# Patient Record
Sex: MALE | Employment: OTHER | URBAN - METROPOLITAN AREA
[De-identification: names, ages, dates, MRNs, and addresses within clinical notes are randomized per-mention and may not be internally consistent; named-entity substitution may affect disease eponyms.]

---

## 2019-03-12 ENCOUNTER — OFFICE VISIT (OUTPATIENT)
Dept: FAMILY MEDICINE CLINIC | Age: 59
End: 2019-03-12

## 2019-03-12 VITALS
HEART RATE: 101 BPM | RESPIRATION RATE: 14 BRPM | OXYGEN SATURATION: 96 % | DIASTOLIC BLOOD PRESSURE: 72 MMHG | SYSTOLIC BLOOD PRESSURE: 104 MMHG | WEIGHT: 198 LBS | HEIGHT: 71 IN | BODY MASS INDEX: 27.72 KG/M2 | TEMPERATURE: 97.7 F

## 2019-03-12 DIAGNOSIS — Z90.49 S/P PARTIAL COLECTOMY: ICD-10-CM

## 2019-03-12 DIAGNOSIS — E11.8 TYPE 2 DIABETES MELLITUS WITH COMPLICATION, UNSPECIFIED WHETHER LONG TERM INSULIN USE: Primary | ICD-10-CM

## 2019-03-12 DIAGNOSIS — K63.1 PERFORATED BOWEL (HCC): ICD-10-CM

## 2019-03-12 RX ORDER — CEFTRIAXONE SODIUM 2 G/1
2 INJECTION, POWDER, FOR SOLUTION INTRAVENOUS EVERY 24 HOURS
COMMUNITY
End: 2019-09-18 | Stop reason: ALTCHOICE

## 2019-03-12 RX ORDER — L. ACIDOPHILUS/L.BULGARICUS 100MM CELL
1 GRANULES IN PACKET (EA) ORAL 2 TIMES DAILY
COMMUNITY
End: 2019-09-18 | Stop reason: ALTCHOICE

## 2019-03-12 RX ORDER — METRONIDAZOLE 500 MG/1
TABLET ORAL 3 TIMES DAILY
COMMUNITY
End: 2019-09-18 | Stop reason: ALTCHOICE

## 2019-03-12 RX ORDER — INSULIN GLARGINE 100 [IU]/ML
INJECTION, SOLUTION SUBCUTANEOUS
COMMUNITY
End: 2019-04-12 | Stop reason: SDUPTHER

## 2019-03-12 RX ORDER — BISMUTH SUBSALICYLATE 262 MG
1 TABLET,CHEWABLE ORAL DAILY
COMMUNITY
End: 2020-06-22 | Stop reason: ALTCHOICE

## 2019-03-12 RX ORDER — LANOLIN ALCOHOL/MO/W.PET/CERES
CREAM (GRAM) TOPICAL DAILY
COMMUNITY
End: 2020-03-20

## 2019-03-12 NOTE — PROGRESS NOTES
.  Chief Complaint   Patient presents with   [address] Patient   [facility] Follow Up     [facility]     . 1. Have you been to the ER, urgent care clinic since your last visit? Hospitalized since your last visit? Yes [facility]    2. Have you seen or consulted any other health care providers outside of the [address] [place] [address] since your last visit? Include any pap smears or colon screening. Yes    . Health Maintenance Due   Topic Date Due    Hepatitis C Screening  1960    DTaP/Tdap/Td series (1 - Tdap) 07/25/1983    Shingrix Vaccine Age 50> (1 of 2) 07/25/2012    FOBT Q 1 YEAR AGE 50-75  07/25/2012    Influenza Age 9 to Adult  08/01/2018     . Jo-Ann Richard

## 2019-03-12 NOTE — PROGRESS NOTES
HPI  Gale Gallagher is a 66 y.o. male who presents to follow-up from a hospital stay [facility] from 3/13/8. Was feeling poorly for about a month prior to that, decreased appetite. Admitted to the hospital and found to have had diverticulitis with a perforated bowel. Received antibiotics. Had a partial colectomy. Had a re-anastomosis. Has already followed up with surgeon and had staples removed. Has a PICC line. Wife has been trained to give him ceftriaxone IV every 24 hours. Do not have the discharge summary at this moment. His discharge paperwork as his current medication list which is accurate to what he is taking. He does have a history of diabetes. Diagnosed 15 years ago. Was placed on metformin lost 115 pounds. He stopped taking the Metformin because he had good control with diet. After a year or 2 he stopped going to the doctor. Estimates he has not had his blood sugar checked in over 10 years. Evidently as part of this hospital stay his blood sugar was found to be elevated and he was started on Lantus 24 units nightly. He was discharged from the hospital with 4 pens which is enough for 50 days    He was drinking 12 beers a day for the previous 2 years. Stopped this about a month before going to the hospital.  Smoking a pack a day for 25 years quit a month before hospital    PMHx:  No past medical history on file. Meds:   Current Outpatient Medications   Medication Sig Dispense Refill    cefTRIAXone (ROCEPHIN) 2 gram solr 2 g by IntraVENous route every twenty-four (24) hours.  insulin glargine (LANTUS,BASAGLAR) 100 unit/mL (3 mL) inpn by SubCUTAneous route.  lactobacillus-acidophilus (LACTINEX) 100 million cell grpk Take 1 Packet by mouth two (2) times a day.  metroNIDAZOLE (FLAGYL) 500 mg tablet Take  by mouth three (3) times daily.  multivitamin (ONE A DAY) tablet Take 1 Tab by mouth daily.  thiamine HCL (B-1) 100 mg tablet Take  by mouth daily. Allergies:   Not on File    Smoker:  Social History     Tobacco Use   Smoking Status Former Smoker    Last attempt to quit: 2019    Years since quittin.0   Smokeless Tobacco Never Used       ETOH:   Social History     Substance and Sexual Activity   Alcohol Use No    Frequency: Never       FH: No family history on file. ROS:   As listed in HPI. In addition:  Constitutional:   No headache, fever, fatigue, weight loss or weight gain      Cardiac:    No chest pain      Resp:   No cough or shortness of breath      Neuro   No loss of consciousness, dizziness, seizures      Physical Exam:  Blood pressure 104/72, pulse (!) 101, temperature 97.7 °F (36.5 °C), resp. rate 14, height 5' 11\" (1.803 m), weight 198 lb (89.8 kg), SpO2 96 %. GEN: No apparent distress. Alert and oriented and responds to all questions appropriately. NEUROLOGIC:  No focal neurologic deficits. Strength and sensation grossly intact. Coordination and gait grossly intact. EXT: Well perfused. No edema. SKIN: No obvious rashes. Lungs clear to auscultation bilaterally  CV regular rate and rhythm no murmur  HEENT clear tympanic membrane clear nasal mucosa clear oromucosa  Abdomen normoactive bowel sounds. There is a midline laparotomy wound that appears to be healing well and a drain in the right lower quadrant that is still draining       Assessment and Plan     Perforated bowel due to diverticulitis status post partial colectomy  Following with surgeon. Postop day 10  Receiving ceftriaxone through PICC line. Wife trained on this  Home health set this up and he believes that he has another nurse coming sometime soon     diabetes  Diagnosis 15 years ago, medication for a year or 2, lost weight and became diet controlled. Lost to follow-up  Currently taking Lantus 24 units    Does not currently have insurance but is working on it. Lantus is not going to be a viable option without insurance. Collect data.   We will try to get the discharge summary and blood work. A1c CMP lipid panel today. May start metformin based on this blood work    Has a meter and has noticed sugar between 1 30200 but checking random times throughout the day. Please check fasting sugars    Follow-up in 2-4 weeks with sugar log    Received discharge summary. Written by his surgeon. Apparently it was a right-sided bowel perforation in the pericecal region suggesting a perforated appendicitis or colonic perforation. Procedure performed was exploratory laparotomy, right hemicolectomy with primary anastomosis with LIU drain. Labs at hospital discharge WBC 9.5, hemoglobin 10.5, calcium 7.7, creatinine 0.4. No mention of hospitalist or infectious disease recommendations    Based on these labs that he has a slight increase in his WBCs. An improvement in his hemoglobin and calcium    Needs his WBCs rechecked in 1 week (about 3/19). We will see if home health can do this      ICD-10-CM ICD-9-CM    1. Type 2 diabetes mellitus with complication, unspecified whether long term insulin use (HCC) E11.8 250.90 HEMOGLOBIN A1C WITH EAG   2. Perforated bowel ([facility] [address] Z48.4 978.56 METABOLIC PANEL, COMPREHENSIVE      CBC WITH AUTOMATED DIFF      LIPID PANEL   3. S/P partial colectomy Z90.49 V45.89        AVS given.  Pt expressed understanding of instructions

## 2019-03-13 ENCOUNTER — TELEPHONE (OUTPATIENT)
Dept: FAMILY MEDICINE CLINIC | Age: 59
End: 2019-03-13

## 2019-03-13 LAB
ALBUMIN SERPL-MCNC: 2.7 G/DL (ref 3.5–5.5)
ALBUMIN/GLOB SERPL: 0.5 {RATIO} (ref 1.2–2.2)
ALP SERPL-CCNC: 106 IU/L (ref 39–117)
ALT SERPL-CCNC: 16 IU/L (ref 0–44)
AST SERPL-CCNC: 18 IU/L (ref 0–40)
BASOPHILS # BLD AUTO: 0 X10E3/UL (ref 0–0.2)
BASOPHILS NFR BLD AUTO: 0 %
BILIRUB SERPL-MCNC: <0.2 MG/DL (ref 0–1.2)
BUN SERPL-MCNC: 8 MG/DL (ref 6–24)
BUN/CREAT SERPL: 15 (ref 9–20)
CALCIUM SERPL-MCNC: 8.4 MG/DL (ref 8.7–10.2)
CHLORIDE SERPL-SCNC: 98 MMOL/L (ref 96–106)
CHOLEST SERPL-MCNC: 148 MG/DL (ref 100–199)
CO2 SERPL-SCNC: 22 MMOL/L (ref 20–29)
CREAT SERPL-MCNC: 0.52 MG/DL (ref 0.76–1.27)
EOSINOPHIL # BLD AUTO: 0.1 X10E3/UL (ref 0–0.4)
EOSINOPHIL NFR BLD AUTO: 1 %
ERYTHROCYTE [DISTWIDTH] IN BLOOD BY AUTOMATED COUNT: 14 % (ref 12.3–15.4)
EST. AVERAGE GLUCOSE BLD GHB EST-MCNC: 252 MG/DL
GLOBULIN SER CALC-MCNC: 5 G/DL (ref 1.5–4.5)
GLUCOSE SERPL-MCNC: 172 MG/DL (ref 65–99)
HBA1C MFR BLD: 10.4 % (ref 4.8–5.6)
HCT VFR BLD AUTO: 37.1 % (ref 37.5–51)
HDLC SERPL-MCNC: 26 MG/DL
HGB BLD-MCNC: 12 G/DL (ref 13–17.7)
IMM GRANULOCYTES # BLD AUTO: 0 X10E3/UL (ref 0–0.1)
IMM GRANULOCYTES NFR BLD AUTO: 0 %
INTERPRETATION, 910389: NORMAL
LDLC SERPL CALC-MCNC: 81 MG/DL (ref 0–99)
LYMPHOCYTES # BLD AUTO: 2.6 X10E3/UL (ref 0.7–3.1)
LYMPHOCYTES NFR BLD AUTO: 23 %
Lab: NORMAL
MCH RBC QN AUTO: 29.3 PG (ref 26.6–33)
MCHC RBC AUTO-ENTMCNC: 32.3 G/DL (ref 31.5–35.7)
MCV RBC AUTO: 91 FL (ref 79–97)
MONOCYTES # BLD AUTO: 1.1 X10E3/UL (ref 0.1–0.9)
MONOCYTES NFR BLD AUTO: 9 %
NEUTROPHILS # BLD AUTO: 7.5 X10E3/UL (ref 1.4–7)
NEUTROPHILS NFR BLD AUTO: 67 %
PLATELET # BLD AUTO: 691 X10E3/UL (ref 150–379)
POTASSIUM SERPL-SCNC: 4.8 MMOL/L (ref 3.5–5.2)
PROT SERPL-MCNC: 7.7 G/DL (ref 6–8.5)
RBC # BLD AUTO: 4.09 X10E6/UL (ref 4.14–5.8)
SODIUM SERPL-SCNC: 138 MMOL/L (ref 134–144)
TRIGL SERPL-MCNC: 206 MG/DL (ref 0–149)
VLDLC SERPL CALC-MCNC: 41 MG/DL (ref 5–40)
WBC # BLD AUTO: 11.3 X10E3/UL (ref 3.4–10.8)

## 2019-03-13 RX ORDER — METFORMIN HYDROCHLORIDE 500 MG/1
500 TABLET ORAL 2 TIMES DAILY WITH MEALS
Qty: 60 TAB | Refills: 3 | Status: SHIPPED | OUTPATIENT
Start: 2019-03-13 | End: 2019-07-06 | Stop reason: SDUPTHER

## 2019-03-13 NOTE — TELEPHONE ENCOUNTER
Patient verified his name and . Patient notified of lab results. Patient will start metformin. He verbalized understanding.

## 2019-03-13 NOTE — TELEPHONE ENCOUNTER
Labs reviewed. Kidney function is fine. We can start a low dose of metformin to help the insulin work a little better. Will call in metformin 500 mg twice daily.   Other signs in the blood work suggest that he is getting over a serious infection but have not received the hospital record yet to be able to compare how he is doing now compared to how he was doing in the hospital

## 2019-03-13 NOTE — TELEPHONE ENCOUNTER
From [employer]:  Pt is requesting to speak with nurse in regard to recent office visit and lab work.       Best contact: [phone]

## 2019-03-14 ENCOUNTER — TELEPHONE (OUTPATIENT)
Dept: FAMILY MEDICINE CLINIC | Age: 59
End: 2019-03-14

## 2019-03-14 NOTE — TELEPHONE ENCOUNTER
Received hospital discharge summary. Comparing his discharge labs to the ones we obtained on 3/12 his WBC count has gone up slightly. This is a marker of infection and needs to be followed closely. Repeat CBC early next week (somewhere around 3/18-3/20). He is receiving home health. See if they can do this.   Otherwise lab visit

## 2019-03-14 NOTE — TELEPHONE ENCOUNTER
Patient verbalized understanding. Patient states [place] only comes on Wednesday. Patient scheduled to have labs on 3/18/19.

## 2019-03-18 ENCOUNTER — TELEPHONE (OUTPATIENT)
Dept: FAMILY MEDICINE CLINIC | Age: 59
End: 2019-03-18

## 2019-03-18 ENCOUNTER — DOCUMENTATION ONLY (OUTPATIENT)
Dept: FAMILY MEDICINE CLINIC | Age: 59
End: 2019-03-18

## 2019-03-18 ENCOUNTER — LAB ONLY (OUTPATIENT)
Dept: FAMILY MEDICINE CLINIC | Age: 59
End: 2019-03-18

## 2019-03-18 DIAGNOSIS — D72.829 LEUKOCYTOSIS, UNSPECIFIED TYPE: Primary | ICD-10-CM

## 2019-03-18 LAB
BASOPHILS # BLD AUTO: 0.1 X10E3/UL (ref 0–0.2)
BASOPHILS NFR BLD AUTO: 1 %
EOSINOPHIL # BLD AUTO: 0.1 X10E3/UL (ref 0–0.4)
EOSINOPHIL NFR BLD AUTO: 1 %
ERYTHROCYTE [DISTWIDTH] IN BLOOD BY AUTOMATED COUNT: 14.6 % (ref 12.3–15.4)
HCT VFR BLD AUTO: 38.5 % (ref 37.5–51)
HGB BLD-MCNC: 13 G/DL (ref 13–17.7)
LYMPHOCYTES # BLD AUTO: 2.3 X10E3/UL (ref 0.7–3.1)
LYMPHOCYTES NFR BLD AUTO: 30 %
MCH RBC QN AUTO: 29.4 PG (ref 26.6–33)
MCHC RBC AUTO-ENTMCNC: 33.8 G/DL (ref 31.5–35.7)
MCV RBC AUTO: 87 FL (ref 79–97)
MONOCYTES # BLD AUTO: 0.9 X10E3/UL (ref 0.1–0.9)
MONOCYTES NFR BLD AUTO: 12 %
NEUTROPHILS # BLD AUTO: 4.3 X10E3/UL (ref 1.4–7)
NEUTROPHILS NFR BLD AUTO: 56 %
PLATELET # BLD AUTO: 549 X10E3/UL (ref 150–379)
RBC # BLD AUTO: 4.42 X10E6/UL (ref 4.14–5.8)
WBC # BLD AUTO: 7.7 X10E3/UL (ref 3.4–10.8)

## 2019-03-18 NOTE — TELEPHONE ENCOUNTER
Patient verified his name and . Patient notified of lab results per Dr. Navin Randhawa. Patient called surgeon. Having repeat ct scan and see's surgeon on Thursday.

## 2019-03-18 NOTE — PROGRESS NOTES
Patient here to get a repeat CBC for a mild leukocytosis increasing since his hospital stay. Called into the lab because last night his drain started changing color. This morning it is a yellow brown opaque fluid. It is thin, not obviously pus but not the healthy pink that we want. No fever. Feels pretty good. Has good appetite  Still getting his antibiotics    Stat CBC    Explained my concern of infection versus leak.   Please reach out to your surgeon

## 2019-04-12 ENCOUNTER — OFFICE VISIT (OUTPATIENT)
Dept: FAMILY MEDICINE CLINIC | Age: 59
End: 2019-04-12

## 2019-04-12 VITALS
HEART RATE: 88 BPM | OXYGEN SATURATION: 96 % | TEMPERATURE: 97.9 F | WEIGHT: 194 LBS | RESPIRATION RATE: 18 BRPM | DIASTOLIC BLOOD PRESSURE: 61 MMHG | BODY MASS INDEX: 27.16 KG/M2 | SYSTOLIC BLOOD PRESSURE: 94 MMHG | HEIGHT: 71 IN

## 2019-04-12 DIAGNOSIS — K63.1 PERFORATED BOWEL (HCC): ICD-10-CM

## 2019-04-12 DIAGNOSIS — E11.8 TYPE 2 DIABETES MELLITUS WITH COMPLICATION, UNSPECIFIED WHETHER LONG TERM INSULIN USE: Primary | ICD-10-CM

## 2019-04-12 RX ORDER — LANCETS
EACH MISCELLANEOUS
Qty: 200 EACH | Refills: 11 | Status: SHIPPED | OUTPATIENT
Start: 2019-04-12

## 2019-04-12 RX ORDER — INSULIN GLARGINE 100 [IU]/ML
20 INJECTION, SOLUTION SUBCUTANEOUS
Qty: 6 ML | Refills: 5 | Status: SHIPPED | OUTPATIENT
Start: 2019-04-12 | End: 2019-09-18 | Stop reason: ALTCHOICE

## 2019-04-12 NOTE — PROGRESS NOTES
HPI  Madyson Ha is a 66 y.o. male who recently establish care with me after a hospital stay Trini Avitia from 3/1-3/8. Was feeling poorly for about a month prior to that, decreased appetite. Admitted to the hospital and found to have had diverticulitis with a perforated bowel. Received antibiotics. Had a partial colectomy. Had a re-anastomosis. Has already followed up with surgeon and had staples removed. Has a PICC line. Wife has been trained to give him ceftriaxone IV every 24 hours.      He does have a history of diabetes. Diagnosed 15 years ago. Was placed on metformin lost 115 pounds. He stopped taking the Metformin because he had good control with diet. After a year or 2 he stopped going to the doctor. Estimates he has not had his blood sugar checked in over 10 years. Evidently as part of this hospital stay his blood sugar was found to be elevated and he was started on Lantus 24 units nightly. He was discharged from the hospital with 4 pens which is enough for 50 days     He was drinking 12 beers a day for the previous 2 years. Stopped this about a month before going to the hospital.  Smoking a pack a day for 25 years quit a month before hospital      PMHx:  No past medical history on file. Meds:   Current Outpatient Medications   Medication Sig Dispense Refill    insulin glargine (LANTUS,BASAGLAR) 100 unit/mL (3 mL) inpn 20 Units by SubCUTAneous route nightly. 6 mL 5    glucose blood VI test strips (BLOOD GLUCOSE TEST) strip Use to check blood sugar 3 times daily 200 Strip 11    lancets misc Test 3 times daily. 200 Each 11    metFORMIN (GLUCOPHAGE) 500 mg tablet Take 1 Tab by mouth two (2) times daily (with meals). 60 Tab 3    lactobacillus-acidophilus (LACTINEX) 100 million cell grpk Take 1 Packet by mouth two (2) times a day.  metroNIDAZOLE (FLAGYL) 500 mg tablet Take  by mouth three (3) times daily.  multivitamin (ONE A DAY) tablet Take 1 Tab by mouth daily.  thiamine HCL (B-1) 100 mg tablet Take  by mouth daily.  cefTRIAXone (ROCEPHIN) 2 gram solr 2 g by IntraVENous route every twenty-four (24) hours. Allergies:   No Known Allergies    Smoker:  Social History     Tobacco Use   Smoking Status Former Smoker    Last attempt to quit: 2019    Years since quittin.1   Smokeless Tobacco Never Used       ETOH:   Social History     Substance and Sexual Activity   Alcohol Use No    Frequency: Never       FH: No family history on file. ROS:   As listed in HPI. In addition:  Constitutional:   No headache, fever, fatigue, weight loss or weight gain      Cardiac:    No chest pain      Resp:   No cough or shortness of breath      Neuro   No loss of consciousness, dizziness, seizures      Physical Exam:  Blood pressure 94/61, pulse 88, temperature 97.9 °F (36.6 °C), temperature source Oral, resp. rate 18, height 5' 11\" (1.803 m), weight 194 lb (88 kg), SpO2 96 %. GEN: No apparent distress. Alert and oriented and responds to all questions appropriately. NEUROLOGIC:  No focal neurologic deficits. Strength and sensation grossly intact. Coordination and gait grossly intact. EXT: Well perfused. No edema. SKIN: No obvious rashes. Assessment and Plan       Diabetes. Currently taking metformin 500 mg twice daily  Currently Lantus 24 units  Fasting blood sugars 80-1 10  Feeling a little run down, not hypoglycemic. Start backing off on the insulin  Start Lantus 20 units  Still has samples he was given in the hospital.  Will call in a refill. Still need to figure out what brand his insurance will cover    Had another abscess. And had a new drain placed yesterday. Blood pressure dropped during procedure. Blood pressure is low today. Not taking any medicine for this. Encouraged hydration    Blood work from yesterday in care everywhere. Still anemic but hemoglobin improving.   White count is normal  Creatinine is normal      ICD-10-CM ICD-9-CM 1. Type 2 diabetes mellitus with complication, unspecified whether long term insulin use ([facility]) E11.8 250.90 insulin glargine (LANTUS,BASAGLAR) 100 unit/mL (3 mL) inpn      glucose blood VI test strips (BLOOD GLUCOSE TEST) strip      lancets misc   2. Perforated bowel ([facility] K63.1 569.83        AVS given.  Pt expressed understanding of instructions

## 2019-04-12 NOTE — PROGRESS NOTES
Chief Complaint   Patient presents with    Diabetes     1 month follow-up      Health Maintenance reviewed     1. Have you been to the ER, urgent care clinic since your last visit? Hospitalized since your last visit? Yes, [facility]    2. Have you seen or consulted any other health care providers outside of the [address] [place] [address] since your last visit? Include any pap smears or colon screening.  Yes, infectious disease- Dr. Ginny Hodge

## 2019-06-04 ENCOUNTER — OFFICE VISIT (OUTPATIENT)
Dept: FAMILY MEDICINE CLINIC | Age: 59
End: 2019-06-04

## 2019-06-04 VITALS
SYSTOLIC BLOOD PRESSURE: 104 MMHG | WEIGHT: 197 LBS | OXYGEN SATURATION: 97 % | RESPIRATION RATE: 16 BRPM | DIASTOLIC BLOOD PRESSURE: 71 MMHG | HEIGHT: 71 IN | BODY MASS INDEX: 27.58 KG/M2 | HEART RATE: 81 BPM | TEMPERATURE: 97.5 F

## 2019-06-04 DIAGNOSIS — M54.42 CHRONIC LEFT-SIDED LOW BACK PAIN WITH LEFT-SIDED SCIATICA: ICD-10-CM

## 2019-06-04 DIAGNOSIS — G89.29 CHRONIC LEFT-SIDED LOW BACK PAIN WITH LEFT-SIDED SCIATICA: ICD-10-CM

## 2019-06-04 DIAGNOSIS — E11.8 TYPE 2 DIABETES MELLITUS WITH COMPLICATION, UNSPECIFIED WHETHER LONG TERM INSULIN USE: Primary | ICD-10-CM

## 2019-06-04 LAB
ALBUMIN UR QL STRIP: 10 MG/L
CREATININE, URINE POC: 100 MG/DL
HBA1C MFR BLD HPLC: 5.5 %
MICROALBUMIN/CREAT RATIO POC: <30 MG/G

## 2019-06-04 RX ORDER — PEN NEEDLE, DIABETIC 30 GX3/16"
NEEDLE, DISPOSABLE MISCELLANEOUS
Qty: 1 PACKAGE | Refills: 11 | Status: SHIPPED | OUTPATIENT
Start: 2019-06-04 | End: 2021-01-13 | Stop reason: SDUPTHER

## 2019-06-04 NOTE — PROGRESS NOTES
PEDRO LUIS Dhaliwal is a 66 y.o. male who recently establish care with me after a hospital stay [facility] Jones from 3/13/8. Was feeling poorly for about a month prior to that, decreased appetite. Admitted to the hospital and found to have had diverticulitis with a perforated bowel. Received antibiotics. Had a partial colectomy. Had a re-anastomosis. Has already followed up with surgeon and had staples removed. Has a PICC line. Wife has been trained to give him ceftriaxone IV every 24 hours.      He does have a history of diabetes. Diagnosed 15 years ago. Was placed on metformin lost 115 pounds. He stopped taking the Metformin because he had good control with diet. After a year or 2 he stopped going to the doctor. Estimates he has not had his blood sugar checked in over 10 years. Evidently as part of this hospital stay his blood sugar was found to be elevated and he was started on Lantus 24 units nightly. He was discharged from the hospital with 4 pens which is enough for 50 days     He was drinking 12 beers a day for the previous 2 years. Stopped this about a month before going to the hospital.  Smoking a pack a day for 25 years quit a month before hospital    Has a history of chronic left-sided low back pain. This was a work-related injury he believes from 2017. He needed surgery at that time for herniated disks and pinched nerves in tolerable pain and inability to improve with physical therapy. Still has some left-sided weakness and notices that his endurance is limited to about 10-15 minutes when working in the garden before he gets left-sided low back pain and has some trouble walking. Points to the left side and quadratus lumborum when describing this and cannot identify particular dermatome when talking about his sciatica. 4/5 strength in the left quad, left hamstring and dorsiflexion of the foot    PMHx:  No past medical history on file.     Meds:   Current Outpatient Medications Medication Sig Dispense Refill    Insulin Needles, Disposable, 31 gauge x \" ndle Inject Lantus daily 1 Package 11    insulin glargine (LANTUS,BASAGLAR) 100 unit/mL (3 mL) inpn 20 Units by SubCUTAneous route nightly. 6 mL 5    glucose blood VI test strips (BLOOD GLUCOSE TEST) strip Use to check blood sugar 3 times daily 200 Strip 11    lancets misc Test 3 times daily. 200 Each 11    metFORMIN (GLUCOPHAGE) 500 mg tablet Take 1 Tab by mouth two (2) times daily (with meals). 60 Tab 3    cefTRIAXone (ROCEPHIN) 2 gram solr 2 g by IntraVENous route every twenty-four (24) hours.  lactobacillus-acidophilus (LACTINEX) 100 million cell grpk Take 1 Packet by mouth two (2) times a day.  metroNIDAZOLE (FLAGYL) 500 mg tablet Take  by mouth three (3) times daily.  multivitamin (ONE A DAY) tablet Take 1 Tab by mouth daily.  thiamine HCL (B-1) 100 mg tablet Take  by mouth daily. Allergies:   No Known Allergies    Smoker:  Social History     Tobacco Use   Smoking Status Former Smoker    Last attempt to quit: 2019    Years since quittin.2   Smokeless Tobacco Never Used       ETOH:   Social History     Substance and Sexual Activity   Alcohol Use No    Frequency: Never       FH: No family history on file. ROS:   As listed in HPI. In addition:  Constitutional:   No headache, fever, fatigue, weight loss or weight gain      Cardiac:    No chest pain      Resp:   No cough or shortness of breath      Neuro   No loss of consciousness, dizziness, seizures      Physical Exam:  Blood pressure 104/71, pulse 81, temperature 97.5 °F (36.4 °C), temperature source Oral, resp. rate 16, height 5' 11\" (1.803 m), weight 197 lb (89.4 kg), SpO2 97 %. GEN: No apparent distress. Alert and oriented and responds to all questions appropriately. NEUROLOGIC:  No focal neurologic deficits. Strength and sensation grossly intact. Coordination and gait grossly intact. EXT: Well perfused. No edema.   SKIN: No obvious rashes. Assessment and Plan       Diabetes. A1c 5.5% down from 10.4% in March  Currently taking metformin 500 mg twice daily  Currently Lantus 20 units  Fasting blood sugars 801 10  One episode of hypoglycemia  Decrease Lantus 15 units ($0 co-pay)    Think we can probably get away with oral medication so we will work to wean down on the insulin. 2-week follow-up, consider increasing metformin to 1000 mg twice daily and further dose reduction of Lantus. When blood sugar starts creeping up there will consider adding glipizide      Abdominal abscesses appear to resolve. Had last drain removed last week        Back pain  I suggest retrying physical therapy since he may have a large muscle group issue and it sounds like it could be an endurance issue  Question of lift restriction. Was restricted to 30 pounds and he is wondering if he can work but not sure what he could do with this restriction  Refer back to orthopedics with that question as well as any intervention that may be beneficial    Check blood work in care everywhere. Last check was 4/16/2019. Creatinine 0.4, GFR greater than 60        ICD-10-CM ICD-9-CM    1. Type 2 diabetes mellitus with complication, unspecified whether long term insulin use ([facility]) E11.8 250.90 Insulin Needles, Disposable, 31 gauge x 5/16\" ndle      AMB POC HEMOGLOBIN A1C      AMB POC URINE, MICROALBUMIN, SEMIQUANT (3 RESULTS)   2. Chronic left-sided low back pain with left-sided sciatica M54.42 724.2 REFERRAL TO PHYSICAL THERAPY    G89.29 724.3 REFERRAL TO ORTHOPEDICS     338.29        AVS given.  Pt expressed understanding of instructions

## 2019-06-04 NOTE — PROGRESS NOTES
.  Chief Complaint   Patient presents with    Diabetes    Rash     . 1. Have you been to the ER, urgent care clinic since your last visit? Hospitalized since your last visit? no    2. Have you seen or consulted any other health care providers outside of the [address] [place] [address] since your last visit? Include any pap smears or colon screening. No    .  Health Maintenance Due   Topic Date Due    Hepatitis C Screening  1960    Pneumococcal 0-64 years (1 of 1 - PPSV23) 07/25/1968    FOOT EXAM Q1  07/25/1972    MICROALBUMIN Q1  07/25/1972    EYE EXAM RETINAL OR DILATED  07/25/1972    DTaP/Tdap/Td series (1 - Tdap) 07/25/1983    Shingrix Vaccine Age 50> (1 of 2) 07/25/2012    FOBT Q 1 YEAR AGE 50-75  07/25/2012     . Vinicio Marie

## 2019-06-19 ENCOUNTER — OFFICE VISIT (OUTPATIENT)
Dept: FAMILY MEDICINE CLINIC | Age: 59
End: 2019-06-19

## 2019-06-19 VITALS
WEIGHT: 204 LBS | TEMPERATURE: 97.8 F | HEIGHT: 71 IN | RESPIRATION RATE: 14 BRPM | HEART RATE: 85 BPM | OXYGEN SATURATION: 95 % | BODY MASS INDEX: 28.56 KG/M2 | SYSTOLIC BLOOD PRESSURE: 127 MMHG | DIASTOLIC BLOOD PRESSURE: 79 MMHG

## 2019-06-19 DIAGNOSIS — M79.18 MYOFASCIAL PAIN: ICD-10-CM

## 2019-06-19 DIAGNOSIS — G89.29 CHRONIC LEFT-SIDED LOW BACK PAIN WITH LEFT-SIDED SCIATICA: ICD-10-CM

## 2019-06-19 DIAGNOSIS — L20.82 FLEXURAL ECZEMA: ICD-10-CM

## 2019-06-19 DIAGNOSIS — M54.42 CHRONIC LEFT-SIDED LOW BACK PAIN WITH LEFT-SIDED SCIATICA: ICD-10-CM

## 2019-06-19 DIAGNOSIS — E11.8 TYPE 2 DIABETES MELLITUS WITH COMPLICATION, UNSPECIFIED WHETHER LONG TERM INSULIN USE: Primary | ICD-10-CM

## 2019-06-19 RX ORDER — TRIAMCINOLONE ACETONIDE 1 MG/G
CREAM TOPICAL 2 TIMES DAILY
Qty: 80 G | Refills: 3 | Status: SHIPPED | OUTPATIENT
Start: 2019-06-19

## 2019-06-19 RX ORDER — TIZANIDINE 4 MG/1
4 TABLET ORAL
Qty: 30 TAB | Refills: 2 | Status: SHIPPED | OUTPATIENT
Start: 2019-06-19 | End: 2020-03-20

## 2019-06-19 NOTE — PROGRESS NOTES
.  .  .  Chief Complaint   Patient presents with    Diabetes    Rash    Tingling     feet and hands    Dizziness         . 1. Have you been to the ER, urgent care clinic since your last visit? Hospitalized since your last visit? no    2. Have you seen or consulted any other health care providers outside of the [address] [place] [address] since your last visit? Include any pap smears or colon screening. No    .  Health Maintenance Due   Topic Date Due    Hepatitis C Screening  1960    Pneumococcal 0-64 years (1 of 1 - PPSV23) 07/25/1968    FOOT EXAM Q1  07/25/1972    EYE EXAM RETINAL OR DILATED  07/25/1972    DTaP/Tdap/Td series (1 - Tdap) 07/25/1983    Shingrix Vaccine Age 50> (1 of 2) 07/25/2012    FOBT Q 1 YEAR AGE 50-75  07/25/2012     . [facility]

## 2019-06-19 NOTE — PROGRESS NOTES
HPI  Delon Galaviz is a 66 y.o. male who recently establish care with me after a hospital stay [facility] from 3/1-3/8. Was feeling poorly for about a month prior to that, decreased appetite. Admitted to the hospital and found to have had diverticulitis with a perforated bowel. Received antibiotics. Had a partial colectomy. Had a re-anastomosis. Has already followed up with surgeon and had staples removed. Has a PICC line. Wife has been trained to give him ceftriaxone IV every 24 hours.      He does have a history of diabetes. Diagnosed 15 years ago. Was placed on metformin lost 115 pounds. He stopped taking the Metformin because he had good control with diet. After a year or 2 he stopped going to the doctor. Estimates he has not had his blood sugar checked in over 10 years. Evidently as part of this hospital stay his blood sugar was found to be elevated and he was started on Lantus 24 units nightly. He was discharged from the hospital with 4 pens which is enough for 50 days     He was drinking 12 beers a day for the previous 2 years. Stopped this about a month before going to the hospital.  Smoking a pack a day for 25 years quit a month before hospital    Has a history of chronic left-sided low back pain. This was a work-related injury he believes from 2017. He needed surgery at that time for herniated disks and pinched nerves in tolerable pain and inability to improve with physical therapy. Still has some left-sided weakness and notices that his endurance is limited to about 10-15 minutes when working in the garden before he gets left-sided low back pain and has some trouble walking. Points to the left side and quadratus lumborum when describing this and cannot identify particular dermatome when talking about his sciatica. 4/5 strength in the left quad, left hamstring and dorsiflexion of the foot    PMHx:  No past medical history on file.     Meds:   Current Outpatient Medications Medication Sig Dispense Refill    tiZANidine (ZANAFLEX) 4 mg tablet Take 1 Tab by mouth nightly as needed (Spasm). 30 Tab 2    triamcinolone acetonide (KENALOG) 0.1 % topical cream Apply  to affected area two (2) times a day. use thin layer 80 g 3    Insulin Needles, Disposable, 31 gauge x 5/16\" ndle Inject Lantus daily 1 Package 11    insulin glargine (LANTUS,BASAGLAR) 100 unit/mL (3 mL) inpn 20 Units by SubCUTAneous route nightly. (Patient taking differently: 15 Units by SubCUTAneous route nightly.) 6 mL 5    glucose blood VI test strips (BLOOD GLUCOSE TEST) strip Use to check blood sugar 3 times daily 200 Strip 11    lancets misc Test 3 times daily. 200 Each 11    metFORMIN (GLUCOPHAGE) 500 mg tablet Take 1 Tab by mouth two (2) times daily (with meals). 60 Tab 3    cefTRIAXone (ROCEPHIN) 2 gram solr 2 g by IntraVENous route every twenty-four (24) hours.  lactobacillus-acidophilus (LACTINEX) 100 million cell grpk Take 1 Packet by mouth two (2) times a day.  metroNIDAZOLE (FLAGYL) 500 mg tablet Take  by mouth three (3) times daily.  multivitamin (ONE A DAY) tablet Take 1 Tab by mouth daily.  thiamine HCL (B-1) 100 mg tablet Take  by mouth daily. Allergies:   No Known Allergies    Smoker:  Social History     Tobacco Use   Smoking Status Former Smoker    Last attempt to quit: 2019    Years since quittin.3   Smokeless Tobacco Never Used       ETOH:   Social History     Substance and Sexual Activity   Alcohol Use No    Frequency: Never       FH: No family history on file. ROS:   As listed in HPI. In addition:  Constitutional:   No headache, fever, fatigue, weight loss or weight gain      Cardiac:    No chest pain      Resp:   No cough or shortness of breath      Neuro   No loss of consciousness, dizziness, seizures      Physical Exam:  Blood pressure 127/79, pulse 85, temperature 97.8 °F (36.6 °C), temperature source Oral, resp.  rate 14, height 5' 11\" (1.803 m), weight 204 lb (92.5 kg), SpO2 95 %. GEN: No apparent distress. Alert and oriented and responds to all questions appropriately. NEUROLOGIC:  No focal neurologic deficits. Strength and sensation grossly intact. Coordination and gait grossly intact. EXT: Well perfused. No edema. SKIN: No obvious rashes. Back examined. Superior trapezius myofascial point right shoulder girdle  4/5 strength in the left quad, left hamstring and dorsiflexion of the foot. No obvious foot drop         Assessment and Plan     Diabetes. A1c 5.5% down from 10.4% in March  Currently taking metformin 500 mg twice daily  Currently Lantus 15 units-fasting sugars less than 120  Decrease this to 10 units  Follow-up 2 weeks    Think we can probably get away with oral medication so we will work to wean down on the insulin. 2-week follow-up, consider increasing metformin to 1000 mg twice daily and further dose reduction of Lantus. When blood sugar starts creeping up there will consider adding glipizide      Abdominal abscesses appear to resolve. Had last drain removed last week    Rash, mild flexural eczema. No obvious exposures  Kenalog    Back pain, left-sided sciatica   Complaint of the left half cramping and aching either when he is walking a distance or when he goes to lie down in bed. Sitting is a little better and standing is a little better. Sounds like neurogenic claudication    New complaint today of right-sided shoulder girdle pain and pain/numbness on the ulnar aspect of the forearm and the fourth and fifth fingers. Negative Spurling's.  2 myofascial points in the shoulder girdle but did not exactly reproduce the radiculopathy    Tizanidine to help with sleep  Reaffirm physical therapy    Suggest to make an appointment to see orthopedics after physical therapy. I do think he needs to see Ortho.   I do think that intervention is likely but suggested he do the physical therapy first since this will probably be a requirement before any intervention is planned      Filled out Social Security form to say to be affect he needs to limit to 30 pounds lifting (per his orthopedist), neurogenic claudication limits him to walking no more than 1 city block. Cannot stand for any more than 15 minutes, cannot sit for any more than 30 minutes comfortably    2-week follow-up on blood sugar      ICD-10-CM ICD-9-CM    1. Type 2 diabetes mellitus with complication, unspecified whether long term insulin use (HCC) E11.8 250.90    2. Chronic left-sided low back pain with left-sided sciatica M54.42 724.2 tiZANidine (ZANAFLEX) 4 mg tablet    G89.29 724.3      338.29    3. Myofascial pain M79.18 729.1 tiZANidine (ZANAFLEX) 4 mg tablet   4. Flexural eczema L20.82 691.8 triamcinolone acetonide (KENALOG) 0.1 % topical cream       AVS given.  Pt expressed understanding of instructions

## 2019-06-20 RX ORDER — PEN NEEDLE, DIABETIC 31 GX3/16"
NEEDLE, DISPOSABLE MISCELLANEOUS
COMMUNITY

## 2019-06-20 NOTE — TELEPHONE ENCOUNTER
Patient says that Dr Doreen Vázquez forgot to call in dm needles when he was seen yesterday. I see that there are 11 ordered on file. Would one of you ladies call him or [facility] point?

## 2019-06-20 NOTE — TELEPHONE ENCOUNTER
Requested Prescriptions     Pending Prescriptions Disp Refills    Insulin Needles, Disposable, 32 gauge x \" ndle 100 Pen Needle 11     Si Pen Needle by Does Not Apply route daily. VORB with Garrel Gaucher (pharmacist) for refill of  pen needles with same directions of injection of Lantus daily per  [facility]. Understanding verbalized.

## 2019-07-08 RX ORDER — METFORMIN HYDROCHLORIDE 500 MG/1
TABLET ORAL
Qty: 60 TAB | Refills: 3 | Status: SHIPPED | OUTPATIENT
Start: 2019-07-08 | End: 2019-09-11 | Stop reason: DRUGHIGH

## 2019-08-22 ENCOUNTER — OFFICE VISIT (OUTPATIENT)
Dept: FAMILY MEDICINE CLINIC | Age: 59
End: 2019-08-22

## 2019-08-22 VITALS
SYSTOLIC BLOOD PRESSURE: 130 MMHG | DIASTOLIC BLOOD PRESSURE: 87 MMHG | WEIGHT: 208 LBS | TEMPERATURE: 97.6 F | HEART RATE: 76 BPM | OXYGEN SATURATION: 98 % | HEIGHT: 71 IN | BODY MASS INDEX: 29.12 KG/M2 | RESPIRATION RATE: 16 BRPM

## 2019-08-22 DIAGNOSIS — E11.8 TYPE 2 DIABETES MELLITUS WITH COMPLICATION, UNSPECIFIED WHETHER LONG TERM INSULIN USE: Primary | ICD-10-CM

## 2019-08-22 DIAGNOSIS — M54.32 SCIATICA OF LEFT SIDE: ICD-10-CM

## 2019-08-22 LAB — HBA1C MFR BLD HPLC: 6.1 %

## 2019-08-22 RX ORDER — GABAPENTIN 100 MG/1
100 CAPSULE ORAL
Qty: 30 CAP | Refills: 2 | Status: SHIPPED | OUTPATIENT
Start: 2019-08-22 | End: 2020-03-20 | Stop reason: SDUPTHER

## 2019-08-22 NOTE — PROGRESS NOTES
PEDRO LUIS Duron is a 64 y.o. male who recently establish care with me after a hospital stay [facility] from 3/13/8. Was feeling poorly for about a month prior to that, decreased appetite. Admitted to the hospital and found to have had diverticulitis with a perforated bowel. Received antibiotics. Had a partial colectomy. Had a re-anastomosis. Has already followed up with surgeon and had staples removed. Has a PICC line. Wife has been trained to give him ceftriaxone IV every 24 hours.      He does have a history of diabetes. Diagnosed 15 years ago. Was placed on metformin lost 115 pounds. He stopped taking the Metformin because he had good control with diet. After a year or 2 he stopped going to the doctor. Estimates he has not had his blood sugar checked in over 10 years. Evidently as part of this hospital stay his blood sugar was found to be elevated and he was started on Lantus 24 units nightly. He was discharged from the hospital with 4 pens which is enough for 50 days     He was drinking 12 beers a day for the previous 2 years. Stopped this about a month before going to the hospital.  Smoking a pack a day for 25 years quit a month before hospital    Has a history of chronic left-sided low back pain. This was a work-related injury he believes from 2017. He needed surgery at that time for herniated disks and pinched nerves in tolerable pain and inability to improve with physical therapy. Still has some left-sided weakness and notices that his endurance is limited to about 10-15 minutes when working in the garden before he gets left-sided low back pain and has some trouble walking. Points to the left side and quadratus lumborum when describing this and cannot identify particular dermatome when talking about his sciatica. 4/5 strength in the left quad, left hamstring and dorsiflexion of the foot    PMHx:  No past medical history on file.     Meds:   Current Outpatient Medications Medication Sig Dispense Refill    gabapentin (NEURONTIN) 100 mg capsule Take 1 Cap by mouth nightly as needed (nerve pain). Max Daily Amount: 100 mg. 30 Cap 2    metFORMIN (GLUCOPHAGE) 500 mg tablet take 1 tablet by mouth twice a day with meals 60 Tab 3    tiZANidine (ZANAFLEX) 4 mg tablet Take 1 Tab by mouth nightly as needed (Spasm). 30 Tab 2    triamcinolone acetonide (KENALOG) 0.1 % topical cream Apply  to affected area two (2) times a day. use thin layer 80 g 3    Insulin Needles, Disposable, 31 gauge x 5/16\" ndle Inject Lantus daily 1 Package 11    insulin glargine (LANTUS,BASAGLAR) 100 unit/mL (3 mL) inpn 20 Units by SubCUTAneous route nightly. (Patient taking differently: 10 Units by SubCUTAneous route nightly.) 6 mL 5    glucose blood VI test strips (BLOOD GLUCOSE TEST) strip Use to check blood sugar 3 times daily 200 Strip 11    lancets misc Test 3 times daily. 200 Each 11    Insulin Needles, Disposable, 32 gauge x 5\" ndle by Does Not Apply route.  cefTRIAXone (ROCEPHIN) 2 gram solr 2 g by IntraVENous route every twenty-four (24) hours.  lactobacillus-acidophilus (LACTINEX) 100 million cell grpk Take 1 Packet by mouth two (2) times a day.  metroNIDAZOLE (FLAGYL) 500 mg tablet Take  by mouth three (3) times daily.  multivitamin (ONE A DAY) tablet Take 1 Tab by mouth daily.  thiamine HCL (B-1) 100 mg tablet Take  by mouth daily. Allergies:   No Known Allergies    Smoker:  Social History     Tobacco Use   Smoking Status Former Smoker    Last attempt to quit: 2019    Years since quittin.5   Smokeless Tobacco Never Used       ETOH:   Social History     Substance and Sexual Activity   Alcohol Use No    Frequency: Never       FH: No family history on file. ROS:   As listed in HPI.  In addition:  Constitutional:   No headache, fever, fatigue, weight loss or weight gain      Cardiac:    No chest pain      Resp:   No cough or shortness of breath      Neuro No loss of consciousness, dizziness, seizures      Physical Exam:  Blood pressure 130/87, pulse 76, temperature 97.6 °F (36.4 °C), temperature source Oral, resp. rate 16, height 5' 11\" (1.803 m), weight 208 lb (94.3 kg), SpO2 98 %. GEN: No apparent distress. Alert and oriented and responds to all questions appropriately. NEUROLOGIC:  No focal neurologic deficits. Strength and sensation grossly intact. Coordination and gait grossly intact. EXT: Well perfused. No edema. SKIN: No obvious rashes. Back examined. Superior trapezius myofascial point right shoulder girdle  4/5 strength in the left quad, left hamstring and dorsiflexion of the foot. No obvious foot drop         Assessment and Plan     Diabetes. A1c 6.1 up from 5.5% down from 10.4% in March  Currently taking metformin 500 mg twice daily  Currently Lantus 10 unitsfasting sugars less than 120  Decrease this to 5 units  Increase metformin to 1000 mg twice daily by ramp-up  Follow-up 2 weeks  Consider stopping insulin  Add glipizide if blood sugar creeping up    Back pain, left-sided sciatica   Complaint of the left half cramping and aching either when he is walking a distance or when he goes to lie down in bed. Sitting is a little better and standing is a little better. Sounds like neurogenic claudication  Tizanidine was mildly effective to help with sleep but while he was getting 8 hours of sleep sleep latency was 2 or 3 hours  Try low-dose gabapentin instead    New complaint today of right-sided shoulder girdle pain and pain/numbness on the ulnar aspect of the forearm and the fourth and fifth fingers. Negative Spurling's.  2 myofascial points in the shoulder girdle but did not exactly reproduce the radiculopathy    Suggest to make an appointment to see orthopedics after physical therapy. I do think he needs to see Ortho.   I do think that intervention is likely but suggested he do the physical therapy first since this will probably be a requirement before any intervention is planned    2-week follow-up on blood sugar      ICD-10-CM ICD-9-CM    1. Type 2 diabetes mellitus with complication, unspecified whether long term insulin use (HCC) E11.8 250.90 AMB POC HEMOGLOBIN A1C   2. Sciatica of left side M54.32 724.3 gabapentin (NEURONTIN) 100 mg capsule       AVS given.  Pt expressed understanding of instructions

## 2019-08-22 NOTE — PROGRESS NOTES
.  Chief Complaint   Patient presents with    Diabetes     . 1. Have you been to the ER, urgent care clinic since your last visit? Hospitalized since your last visit? No    2. Have you seen or consulted any other health care providers outside of the [address] [place] [address] since your last visit? Include any pap smears or colon screening. No     .  Health Maintenance Due   Topic Date Due    Hepatitis C Screening  1960    Pneumococcal 0-64 years (1 of 1 - PPSV23) 07/25/1968    FOOT EXAM Q1  07/25/1972    EYE EXAM RETINAL OR DILATED  07/25/1972    DTaP/Tdap/Td series (1 - Tdap) 07/25/1983    Shingrix Vaccine Age 50> (1 of 2) 07/25/2012    FOBT Q 1 YEAR AGE 50-75  07/25/2012     . Rosanne Etienne

## 2019-09-11 ENCOUNTER — OFFICE VISIT (OUTPATIENT)
Dept: FAMILY MEDICINE CLINIC | Age: 59
End: 2019-09-11

## 2019-09-11 VITALS
OXYGEN SATURATION: 94 % | BODY MASS INDEX: 29.26 KG/M2 | HEART RATE: 81 BPM | WEIGHT: 209 LBS | TEMPERATURE: 98.4 F | HEIGHT: 71 IN | DIASTOLIC BLOOD PRESSURE: 66 MMHG | SYSTOLIC BLOOD PRESSURE: 113 MMHG | RESPIRATION RATE: 16 BRPM

## 2019-09-11 DIAGNOSIS — E11.8 TYPE 2 DIABETES MELLITUS WITH COMPLICATION, UNSPECIFIED WHETHER LONG TERM INSULIN USE: ICD-10-CM

## 2019-09-11 DIAGNOSIS — R22.41 MASS OF LEG, RIGHT: Primary | ICD-10-CM

## 2019-09-11 DIAGNOSIS — G89.29 CHRONIC LEFT-SIDED LOW BACK PAIN WITH LEFT-SIDED SCIATICA: ICD-10-CM

## 2019-09-11 DIAGNOSIS — M54.42 CHRONIC LEFT-SIDED LOW BACK PAIN WITH LEFT-SIDED SCIATICA: ICD-10-CM

## 2019-09-11 DIAGNOSIS — Z23 ENCOUNTER FOR IMMUNIZATION: ICD-10-CM

## 2019-09-11 RX ORDER — METFORMIN HYDROCHLORIDE 1000 MG/1
1000 TABLET ORAL 2 TIMES DAILY WITH MEALS
Qty: 180 TAB | Refills: 3 | Status: SHIPPED | OUTPATIENT
Start: 2019-09-11 | End: 2020-09-11

## 2019-09-11 NOTE — PROGRESS NOTES
Chief Complaint   Patient presents with    Diabetes    Cyst     right knee    Varicose Veins     right leg        1. Have you been to the ER, urgent care clinic since your last visit? Hospitalized since your last visit? No    2. Have you seen or consulted any other health care providers outside of the [address] [place] [address] since your last visit? Include any pap smears or colon screening.  No    Health Maintenance Due   Topic Date Due    Hepatitis C Screening  1960    Pneumococcal 0-64 years (1 of 1 - PPSV23) 07/25/1968    FOOT EXAM Q1  07/25/1972    EYE EXAM RETINAL OR DILATED  07/25/1972    DTaP/Tdap/Td series (1 - Tdap) 07/25/1983    Shingrix Vaccine Age 50> (1 of 2) 07/25/2012    FOBT Q 1 YEAR AGE 50-75  07/25/2012

## 2019-09-11 NOTE — PROGRESS NOTES
PEDRO LUIS Castillo is a 64 y.o. male who recently establish care with me after a hospital stay [facility] from 3/13/8. Was feeling poorly for about a month prior to that, decreased appetite. Admitted to the hospital and found to have had diverticulitis with a perforated bowel. Received antibiotics. Had a partial colectomy. Had a re-anastomosis. Has already followed up with surgeon and had staples removed. Has a PICC line. Wife has been trained to give him ceftriaxone IV every 24 hours.      He does have a history of diabetes. Diagnosed 15 years ago. Was placed on metformin lost 115 pounds. He stopped taking the Metformin because he had good control with diet. After a year or 2 he stopped going to the doctor. Estimates he has not had his blood sugar checked in over 10 years. Evidently as part of this hospital stay his blood sugar was found to be elevated and he was started on Lantus 24 units nightly. He was discharged from the hospital with 4 pens which is enough for 50 days     He was drinking 12 beers a day for the previous 2 years. Stopped this about a month before going to the hospital.  Smoking a pack a day for 25 years quit a month before hospital    Has a history of chronic left-sided low back pain. This was a work-related injury he believes from 2017. He needed surgery at that time for herniated disks and pinched nerves in tolerable pain and inability to improve with physical therapy. Still has some left-sided weakness and notices that his endurance is limited to about 10-15 minutes when working in the garden before he gets left-sided low back pain and has some trouble walking. Points to the left side and quadratus lumborum when describing this and cannot identify particular dermatome when talking about his sciatica. 4/5 strength in the left quad, left hamstring and dorsiflexion of the foot    PMHx:  History reviewed. No pertinent past medical history.     Meds:   Current Outpatient Medications   Medication Sig Dispense Refill    metFORMIN (GLUCOPHAGE) 1,000 mg tablet Take 1 Tab by mouth two (2) times daily (with meals). 180 Tab 3    gabapentin (NEURONTIN) 100 mg capsule Take 1 Cap by mouth nightly as needed (nerve pain). Max Daily Amount: 100 mg. 30 Cap 2    Insulin Needles, Disposable, 32 gauge x /\" ndle by Does Not Apply route.  triamcinolone acetonide (KENALOG) 0.1 % topical cream Apply  to affected area two (2) times a day. use thin layer 80 g 3    Insulin Needles, Disposable, 31 gauge x 5/16\" ndle Inject Lantus daily 1 Package 11    insulin glargine (LANTUS,BASAGLAR) 100 unit/mL (3 mL) inpn 20 Units by SubCUTAneous route nightly. (Patient taking differently: 5 Units by SubCUTAneous route nightly.) 6 mL 5    glucose blood VI test strips (BLOOD GLUCOSE TEST) strip Use to check blood sugar 3 times daily 200 Strip 11    lancets misc Test 3 times daily. 200 Each 11    tiZANidine (ZANAFLEX) 4 mg tablet Take 1 Tab by mouth nightly as needed (Spasm). 30 Tab 2    cefTRIAXone (ROCEPHIN) 2 gram solr 2 g by IntraVENous route every twenty-four (24) hours.  lactobacillus-acidophilus (LACTINEX) 100 million cell grpk Take 1 Packet by mouth two (2) times a day.  metroNIDAZOLE (FLAGYL) 500 mg tablet Take  by mouth three (3) times daily.  multivitamin (ONE A DAY) tablet Take 1 Tab by mouth daily.  thiamine HCL (B-1) 100 mg tablet Take  by mouth daily. Allergies:   No Known Allergies    Smoker:  Social History     Tobacco Use   Smoking Status Former Smoker    Last attempt to quit: 2019    Years since quittin.5   Smokeless Tobacco Never Used       ETOH:   Social History     Substance and Sexual Activity   Alcohol Use No    Frequency: Never       FH: History reviewed. No pertinent family history. ROS:   As listed in HPI.  In addition:  Constitutional:   No headache, fever, fatigue, weight loss or weight gain      Cardiac:    No chest pain Resp:   No cough or shortness of breath      Neuro   No loss of consciousness, dizziness, seizures      Physical Exam:  Blood pressure 113/66, pulse 81, temperature 98.4 °F (36.9 °C), temperature source Oral, resp. rate 16, height 5' 11\" (1.803 m), weight 209 lb (94.8 kg), SpO2 94 %. GEN: No apparent distress. Alert and oriented and responds to all questions appropriately. NEUROLOGIC:  No focal neurologic deficits. Strength and sensation grossly intact. Coordination and gait grossly intact. EXT: Well perfused. No edema. SKIN: No obvious rashes. Back examined. Superior trapezius myofascial point right shoulder girdle  4/5 strength in the left quad, left hamstring and dorsiflexion of the foot. No obvious foot drop         Assessment and Plan     Diabetes. A1c 6.1 up from 5.5% down from 10.4% in March  Currently taking metformin 500am, 100mg pm mg twice daily  Currently Lantus 5 unitsfasting sugars 120-140    Increase metformin to 1000 mg twice daily  Stop insulin  Add glipizide if blood sugar creeping up    Back pain, left-sided sciatica   Complaint of the left half cramping and aching either when he is walking a distance or when he goes to lie down in bed. Sitting is a little better and standing is a little better. Sounds like neurogenic claudication  Tizanidine was mildly effective to help with sleep but while he was getting 8 hours of sleep sleep latency was 2 or 3 hours  Try low-dose gabapentin instead  Just now made an appointment for physical therapy. Has not started yet      Right leg mass. It is superior medial to the patella. It is about 2.5 inches in diameter. Firm, almost hard. Nontender. Appears to be tacked to the quad muscle. Says it is getting bigger recently. Not sure when it started but has been present for years. Thinks that might even have started in his teens   start with x-ray and consider ultrasound if it is soft tissue      3-4-week follow-up on blood sugar.   Bring log.  If all is well controlled next A1c would be November        ICD-10-CM ICD-9-CM    1. Mass of leg, right R22.41 782. 2 XR KNEE RT MIN 4 V   2. Type 2 diabetes mellitus with complication, unspecified whether long term insulin use (HCC) E11.8 250.90    3. Encounter for immunization Z23 V03.89    4. Chronic left-sided low back pain with left-sided sciatica M54.42 724.2     G89.29 724.3      338.29        AVS given.  Pt expressed understanding of instructions

## 2019-09-18 ENCOUNTER — OFFICE VISIT (OUTPATIENT)
Dept: FAMILY MEDICINE CLINIC | Age: 59
End: 2019-09-18

## 2019-09-18 VITALS
OXYGEN SATURATION: 96 % | BODY MASS INDEX: 29.26 KG/M2 | TEMPERATURE: 97.9 F | WEIGHT: 209 LBS | HEIGHT: 71 IN | SYSTOLIC BLOOD PRESSURE: 137 MMHG | RESPIRATION RATE: 18 BRPM | HEART RATE: 76 BPM | DIASTOLIC BLOOD PRESSURE: 75 MMHG

## 2019-09-18 DIAGNOSIS — E11.8 TYPE 2 DIABETES MELLITUS WITH COMPLICATION, UNSPECIFIED WHETHER LONG TERM INSULIN USE: Primary | ICD-10-CM

## 2019-09-18 DIAGNOSIS — Z90.49 S/P PARTIAL COLECTOMY: ICD-10-CM

## 2019-09-19 LAB
ALBUMIN SERPL-MCNC: 4.3 G/DL (ref 3.5–5.5)
ALBUMIN/GLOB SERPL: 1.7 {RATIO} (ref 1.2–2.2)
ALP SERPL-CCNC: 93 IU/L (ref 39–117)
ALT SERPL-CCNC: 16 IU/L (ref 0–44)
AST SERPL-CCNC: 15 IU/L (ref 0–40)
BASOPHILS # BLD AUTO: 0.1 X10E3/UL (ref 0–0.2)
BASOPHILS NFR BLD AUTO: 1 %
BILIRUB SERPL-MCNC: <0.2 MG/DL (ref 0–1.2)
BUN SERPL-MCNC: 13 MG/DL (ref 6–24)
BUN/CREAT SERPL: 17 (ref 9–20)
CALCIUM SERPL-MCNC: 9 MG/DL (ref 8.7–10.2)
CHLORIDE SERPL-SCNC: 100 MMOL/L (ref 96–106)
CO2 SERPL-SCNC: 23 MMOL/L (ref 20–29)
CREAT SERPL-MCNC: 0.75 MG/DL (ref 0.76–1.27)
EOSINOPHIL # BLD AUTO: 0.2 X10E3/UL (ref 0–0.4)
EOSINOPHIL NFR BLD AUTO: 3 %
ERYTHROCYTE [DISTWIDTH] IN BLOOD BY AUTOMATED COUNT: 15 % (ref 12.3–15.4)
GLOBULIN SER CALC-MCNC: 2.6 G/DL (ref 1.5–4.5)
GLUCOSE SERPL-MCNC: 103 MG/DL (ref 65–99)
HCT VFR BLD AUTO: 41.6 % (ref 37.5–51)
HGB BLD-MCNC: 14.3 G/DL (ref 13–17.7)
IMM GRANULOCYTES # BLD AUTO: 0 X10E3/UL (ref 0–0.1)
IMM GRANULOCYTES NFR BLD AUTO: 0 %
LYMPHOCYTES # BLD AUTO: 3.5 X10E3/UL (ref 0.7–3.1)
LYMPHOCYTES NFR BLD AUTO: 39 %
MCH RBC QN AUTO: 29.9 PG (ref 26.6–33)
MCHC RBC AUTO-ENTMCNC: 34.4 G/DL (ref 31.5–35.7)
MCV RBC AUTO: 87 FL (ref 79–97)
MONOCYTES # BLD AUTO: 0.8 X10E3/UL (ref 0.1–0.9)
MONOCYTES NFR BLD AUTO: 9 %
NEUTROPHILS # BLD AUTO: 4.4 X10E3/UL (ref 1.4–7)
NEUTROPHILS NFR BLD AUTO: 48 %
PLATELET # BLD AUTO: 246 X10E3/UL (ref 150–450)
POTASSIUM SERPL-SCNC: 4.4 MMOL/L (ref 3.5–5.2)
PROT SERPL-MCNC: 6.9 G/DL (ref 6–8.5)
RBC # BLD AUTO: 4.79 X10E6/UL (ref 4.14–5.8)
SODIUM SERPL-SCNC: 139 MMOL/L (ref 134–144)
WBC # BLD AUTO: 9 X10E3/UL (ref 3.4–10.8)

## 2019-09-27 NOTE — PROGRESS NOTES
Chief Complaint   Patient presents with    Cyst     Near belly button      Patient reports that he has noted a bulge near his bellybutton at the site of his prior colectomy for the past few weeks. Patient reports that he feels as though the bulge is getting bigger, is sometimes tender, but never becomes hard. Patient reports that he is always able to push the bulge down. Subjective: (As above and below)     Chief Complaint   Patient presents with    Cyst     Near belly button      he is a 64y.o. year old male who presents for evaluation. Reviewed PmHx, RxHx, FmHx, SocHx, AllgHx and updated in chart. Review of Systems - negative except as listed above    Objective:     Vitals:    09/18/19 1332   BP: 137/75   Pulse: 76   Resp: 18   Temp: 97.9 °F (36.6 °C)   TempSrc: Oral   SpO2: 96%   Weight: 209 lb (94.8 kg)   Height: 5' 11\" (1.803 m)     Physical Examination: General appearance - alert, well appearing, and in no distress  Mental status - normal mood, behavior, speech, dress, motor activity, and thought processes  Mouth - mucous membranes moist, pharynx normal without lesions  Chest - clear to auscultation, no wheezes, rales or rhonchi, symmetric air entry  Heart - normal rate, regular rhythm, normal S1, S2, no murmurs, rubs, clicks or gallops  Abdomen - soft, nontender, nondistended, no masses or organomegaly  HERNIA EXAM: incisional hernia easily reducible    Assessment/ Plan:   1. Type 2 diabetes mellitus with complication, unspecified whether long term insulin use ([facility] [address]  -Check labs today  - CBC WITH AUTOMATED DIFF  - METABOLIC PANEL, COMPREHENSIVE    2. S/P partial colectomy  -Advised patient to follow-up with his surgeon regarding strong suspicion for incisional hernia, advised patient to monitor weight and work on weight reduction.   Primary reason for follow-up with surgeon is increasing reported size of hernia     Follow-up as needed       I have discussed the diagnosis with the patient and the intended plan as seen in the above orders. The patient has received an after-visit summary and questions were answered concerning future plans.      Medication Side Effects and Warnings were discussed with patient: yes  Patient Labs were reviewed: yes  Patient Past Records were reviewed:  yes    Mary Jane Marcelo M.D.

## 2019-10-08 ENCOUNTER — TELEPHONE (OUTPATIENT)
Dept: FAMILY MEDICINE CLINIC | Age: 59
End: 2019-10-08

## 2019-10-08 ENCOUNTER — HOSPITAL ENCOUNTER (OUTPATIENT)
Dept: GENERAL RADIOLOGY | Age: 59
Discharge: HOME | End: 2019-10-08
Payer: MEDICARE

## 2019-10-08 DIAGNOSIS — R22.41 MASS OF LEG, RIGHT: ICD-10-CM

## 2019-10-08 DIAGNOSIS — R22.41 MASS OF RIGHT LOWER EXTREMITY: Primary | ICD-10-CM

## 2019-10-08 PROCEDURE — 73564 X-RAY EXAM KNEE 4 OR MORE: CPT

## 2019-10-08 NOTE — TELEPHONE ENCOUNTER
X-ray reviewed. The mass near his right knee that we are interested in is all soft tissue so it did not show up very well on the x-ray. Radiologist suggested an MRI if we wanted a better view. If he would like to proceed with this I can put an order in.

## 2019-10-09 NOTE — TELEPHONE ENCOUNTER
Called pt, verified name and . Informed pt that per Dr. Brenda Ortiz reviewed. The mass near his right knee that we are interested in is all soft tissue so it did not show up very well on the x-ray. Radiologist suggested an MRI if we wanted a better view. If he would like to proceed with this I can put an order in. Pt stated he would like an order put in for the MRI please.

## 2019-10-22 ENCOUNTER — TELEPHONE (OUTPATIENT)
Dept: FAMILY MEDICINE CLINIC | Age: 59
End: 2019-10-22

## 2019-10-22 ENCOUNTER — HOSPITAL ENCOUNTER (OUTPATIENT)
Dept: MRI IMAGING | Age: 59
Discharge: HOME | End: 2019-10-22
Attending: FAMILY MEDICINE
Payer: MEDICAID

## 2019-10-22 DIAGNOSIS — R22.41 MASS OF RIGHT LOWER EXTREMITY: ICD-10-CM

## 2019-10-22 PROCEDURE — 74011250636 HC RX REV CODE- 250/636: Performed by: FAMILY MEDICINE

## 2019-10-22 PROCEDURE — A9575 INJ GADOTERATE MEGLUMI 0.1ML: HCPCS | Performed by: FAMILY MEDICINE

## 2019-10-22 PROCEDURE — 73720 MRI LWR EXTREMITY W/O&W/DYE: CPT

## 2019-10-22 RX ORDER — GADOTERATE MEGLUMINE 376.9 MG/ML
20 INJECTION INTRAVENOUS
Status: COMPLETED | OUTPATIENT
Start: 2019-10-22 | End: 2019-10-22

## 2019-10-22 RX ADMIN — GADOTERATE MEGLUMINE 20 ML: 376.9 INJECTION INTRAVENOUS at 14:25

## 2019-10-22 NOTE — TELEPHONE ENCOUNTER
MRI of the right leg mass was reviewed. 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 Review of literature suggests liposarcoma can range and aggressiveness from 100% 5-year survival to 50% 5-year survival depending on the histology. Explained this to patient. Needs further work-up.   Pointed him in direction of [facility] surgical oncology department [phone]     As it happens he will be getting hernia surgery this week and [place]

## 2019-10-23 ENCOUNTER — TELEPHONE (OUTPATIENT)
Dept: FAMILY MEDICINE CLINIC | Age: 59
End: 2019-10-23

## 2019-10-23 NOTE — TELEPHONE ENCOUNTER
[facility] called to confirm we got report of MRI w/wo contrast of R Femur taken yesterday. 10/22. I confirmed we had received the results and would be sure to make Dr. Allyssa Andrade aware.

## 2019-12-17 ENCOUNTER — OFFICE VISIT (OUTPATIENT)
Dept: FAMILY MEDICINE CLINIC | Age: 59
End: 2019-12-17

## 2019-12-17 VITALS
OXYGEN SATURATION: 93 % | DIASTOLIC BLOOD PRESSURE: 85 MMHG | TEMPERATURE: 97.5 F | HEART RATE: 88 BPM | BODY MASS INDEX: 31.05 KG/M2 | RESPIRATION RATE: 18 BRPM | HEIGHT: 71 IN | SYSTOLIC BLOOD PRESSURE: 137 MMHG | WEIGHT: 221.8 LBS

## 2019-12-17 DIAGNOSIS — R22.41 MASS OF RIGHT LOWER EXTREMITY: ICD-10-CM

## 2019-12-17 DIAGNOSIS — E11.9 TYPE 2 DIABETES MELLITUS WITHOUT COMPLICATION, WITHOUT LONG-TERM CURRENT USE OF INSULIN (HCC): Primary | ICD-10-CM

## 2019-12-17 LAB — HBA1C MFR BLD HPLC: 6.7 %

## 2019-12-17 NOTE — PROGRESS NOTES
HPI  Jessica Juarez is a 64 y.o. male who recently establish care with me in 3/2019    Follow-up on diabetes. Diagnosed 15 years ago. Lost 115 pounds and was only doing diet control for a while. After a hospital stay for perforated bowel 3/2019 he was started on Lantus which we managed to wean off off over the last year. He is currently on metformin but is forgetting to take it at least once a day sometimes misses both pills in a day. Cites being busy     PMHx:  History reviewed. No pertinent past medical history. Meds:   Current Outpatient Medications   Medication Sig Dispense Refill    metFORMIN (GLUCOPHAGE) 1,000 mg tablet Take 1 Tab by mouth two (2) times daily (with meals). 180 Tab 3    gabapentin (NEURONTIN) 100 mg capsule Take 1 Cap by mouth nightly as needed (nerve pain). Max Daily Amount: 100 mg. 30 Cap 2    Insulin Needles, Disposable, 32 gauge x 5/32\" ndle by Does Not Apply route.  triamcinolone acetonide (KENALOG) 0.1 % topical cream Apply  to affected area two (2) times a day. use thin layer 80 g 3    Insulin Needles, Disposable, 31 gauge x 5/16\" ndle Inject Lantus daily 1 Package 11    glucose blood VI test strips (BLOOD GLUCOSE TEST) strip Use to check blood sugar 3 times daily 200 Strip 11    lancets misc Test 3 times daily. 200 Each 11    tiZANidine (ZANAFLEX) 4 mg tablet Take 1 Tab by mouth nightly as needed (Spasm). 30 Tab 2    multivitamin (ONE A DAY) tablet Take 1 Tab by mouth daily.  thiamine HCL (B-1) 100 mg tablet Take  by mouth daily.          Allergies:   No Known Allergies    Smoker:  Social History     Tobacco Use   Smoking Status Former Smoker    Last attempt to quit: 2019    Years since quittin.8   Smokeless Tobacco Never Used       ETOH:   Social History     Substance and Sexual Activity   Alcohol Use No    Frequency: Never       FH:   Family History   Problem Relation Age of Onset    Pneumonia Mother     Heart Disease Father        ROS:   As listed in HPI. In addition:  Constitutional:   No headache, fever, fatigue, weight loss or weight gain      Cardiac:    No chest pain      Resp:   No cough or shortness of breath      Neuro   No loss of consciousness, dizziness, seizures      Physical Exam:  Blood pressure 137/85, pulse 88, temperature 97.5 °F (36.4 °C), temperature source Oral, resp. rate 18, height 5' 11\" (1.803 m), weight 221 lb 12.8 oz (100.6 kg), SpO2 93 %. GEN: No apparent distress. Alert and oriented and responds to all questions appropriately. NEUROLOGIC:  No focal neurologic deficits. Strength and sensation grossly intact. Coordination and gait grossly intact. EXT: Well perfused. No edema. SKIN: No obvious rashes. Back examined. Superior trapezius myofascial point right shoulder girdle  4/5 strength in the left quad, left hamstring and dorsiflexion of the foot. No obvious foot drop         Assessment and Plan     Diabetes. A1c 6.7% up from 6.1 up from 5.5% down from 10.4% in March. Currently taking metformin 1000 mg twice daily but is missing at least 1 pill/day    Thought about strategies to help him remember to take medicine  He is opted for 3-month follow-up to make sure that he is staying well controlled    Possible liposarcoma  MRI back in October showed \"concerning for low-grade liposarcoma\" in the right thigh  Point of the direction of surgical oncology at [facility], will be seeing them this Thursday    Follow-up 3 months diabetes, April        ICD-10-CM ICD-9-CM    1. Type 2 diabetes mellitus without complication, without long-term current use of insulin ([facility]) E11.9 250.00    2. Mass of right lower extremity R22.41 782.2        AVS given.  Pt expressed understanding of instructions

## 2019-12-17 NOTE — PROGRESS NOTES
1. Have you been to the ER, urgent care clinic since your last visit? Hospitalized since your last visit? Yes, [facility]    2. Have you seen or consulted any other health care providers outside of the [address] [place] [address] since your last visit? Include any pap smears or colon screening.  Yes, [facility]     Health Maintenance Due   Topic Date Due    Hepatitis C Screening  1960    Pneumococcal 0-64 years (1 of 1 - PPSV23) 07/25/1968    FOOT EXAM Q1  07/25/1972    EYE EXAM RETINAL OR DILATED  07/25/1972    DTaP/Tdap/Td series (1 - Tdap) 07/25/1973    Shingrix Vaccine Age 50> (1 of 2) 07/25/2012    FOBT Q 1 YEAR AGE 50-75  07/25/2012    Influenza Age 9 to Adult  08/01/2019

## 2020-03-20 ENCOUNTER — OFFICE VISIT (OUTPATIENT)
Dept: FAMILY MEDICINE CLINIC | Age: 60
End: 2020-03-20

## 2020-03-20 VITALS
OXYGEN SATURATION: 95 % | DIASTOLIC BLOOD PRESSURE: 84 MMHG | SYSTOLIC BLOOD PRESSURE: 134 MMHG | RESPIRATION RATE: 16 BRPM | TEMPERATURE: 97.6 F | HEART RATE: 79 BPM | HEIGHT: 71 IN | WEIGHT: 235.8 LBS | BODY MASS INDEX: 33.01 KG/M2

## 2020-03-20 DIAGNOSIS — M54.32 SCIATICA OF LEFT SIDE: ICD-10-CM

## 2020-03-20 DIAGNOSIS — Z12.11 COLON CANCER SCREENING: ICD-10-CM

## 2020-03-20 DIAGNOSIS — E11.9 TYPE 2 DIABETES MELLITUS WITHOUT COMPLICATION, WITHOUT LONG-TERM CURRENT USE OF INSULIN (HCC): Primary | ICD-10-CM

## 2020-03-20 LAB — HBA1C MFR BLD HPLC: 7.5 %

## 2020-03-20 RX ORDER — GLIPIZIDE 5 MG/1
5 TABLET ORAL 2 TIMES DAILY
Qty: 180 TAB | Refills: 3 | Status: SHIPPED | OUTPATIENT
Start: 2020-03-20 | End: 2021-05-14

## 2020-03-20 RX ORDER — SENNOSIDES 8.6 MG
TABLET ORAL
COMMUNITY
Start: 2020-01-08 | End: 2020-03-18

## 2020-03-20 RX ORDER — GABAPENTIN 100 MG/1
200 CAPSULE ORAL
Qty: 180 CAP | Refills: 1 | Status: SHIPPED | OUTPATIENT
Start: 2020-03-20 | End: 2020-12-21

## 2020-03-20 RX ORDER — DOCUSATE SODIUM 100 MG/1
CAPSULE, LIQUID FILLED ORAL
COMMUNITY
Start: 2020-01-08 | End: 2020-01-18

## 2020-03-20 NOTE — PROGRESS NOTES
PEDRO LUIS Benites is a 64 y.o. male who recently establish care with me in 3/2019    Follow-up on diabetes. Diagnosed 15 years ago. Lost 115 pounds and was only doing diet control for a while. After a hospital stay for perforated bowel 3/2019 he was started on Lantus which we managed to wean off off over the last year. He is currently on metformin but is forgetting to take it once every 2 weeks    Diet changed over the 2019 season that he gained 15 pounds. A1c has been trending up    PMHx:  History reviewed. No pertinent past medical history. Meds:   Current Outpatient Medications   Medication Sig Dispense Refill    gabapentin (NEURONTIN) 100 mg capsule Take 2 Caps by mouth nightly as needed (nerve pain). Max Daily Amount: 200 mg. 180 Cap 1    glipiZIDE (GLUCOTROL) 5 mg tablet Take 1 Tab by mouth two (2) times a day. 180 Tab 3    metFORMIN (GLUCOPHAGE) 1,000 mg tablet Take 1 Tab by mouth two (2) times daily (with meals). 180 Tab 3    Insulin Needles, Disposable, 32 gauge x 5/32\" ndle by Does Not Apply route.  triamcinolone acetonide (KENALOG) 0.1 % topical cream Apply  to affected area two (2) times a day. use thin layer 80 g 3    Insulin Needles, Disposable, 31 gauge x 5/16\" ndle Inject Lantus daily 1 Package 11    glucose blood VI test strips (BLOOD GLUCOSE TEST) strip Use to check blood sugar 3 times daily 200 Strip 11    lancets misc Test 3 times daily. 200 Each 11    multivitamin (ONE A DAY) tablet Take 1 Tab by mouth daily.  tiZANidine (ZANAFLEX) 4 mg tablet Take 1 Tab by mouth nightly as needed (Spasm). 30 Tab 2    thiamine HCL (B-1) 100 mg tablet Take  by mouth daily.          Allergies:   No Known Allergies    Smoker:  Social History     Tobacco Use   Smoking Status Former Smoker    Last attempt to quit: 2019    Years since quittin.0   Smokeless Tobacco Never Used       ETOH:   Social History     Substance and Sexual Activity   Alcohol Use No    Frequency: Never       FH:   Family History   Problem Relation Age of Onset    Pneumonia Mother     Heart Disease Father        ROS:   As listed in HPI. In addition:  Constitutional:   No headache, fever, fatigue, weight loss or weight gain      Cardiac:    No chest pain      Resp:   No cough or shortness of breath      Neuro   No loss of consciousness, dizziness, seizures      Physical Exam:  Blood pressure 134/84, pulse 79, temperature 97.6 °F (36.4 °C), temperature source Oral, resp. rate 16, height 5' 11\" (1.803 m), weight 235 lb 12.8 oz (107 kg), SpO2 95 %. GEN: No apparent distress. Alert and oriented and responds to all questions appropriately. NEUROLOGIC:  No focal neurologic deficits. Strength and sensation grossly intact. Coordination and gait grossly intact. EXT: Well perfused. No edema. SKIN: No obvious rashes. Assessment and Plan     Diabetes. A1c 7.5 up from 6.7% up from 6.1 up from 5.5% down from 10.4% in March. Currently taking metformin 1000 mg twice daily   And glipizide 5 mg twice daily  Work on diet  3-month follow-up    Possible liposarcoma  MRI back in October showed \"concerning for low-grade liposarcoma\" in the right thigh  Saw [facility] surgical oncology. Removed, reviewed the [facility] record benign findings. Follow-up as needed    Neuropathy. Left leg. Primarily involving the the top of the left foot and second third and fourth toes (L5 distribution). Bothers him at rest and at night   Gabapentin 100 mg worked wonders for a few months and then had diminishing returns. Now he cannot tell that he is taking the medication    Increase gabapentin due to milligrams nightly, may increase to 300 mg  He may need to request an early refill if he goes to 300 mg  If 300 mg not effective may add nortriptyline    Surveillance labs today  Refer for colonoscopy    Follow-up 3 months diabetes, June  A1c only        ICD-10-CM ICD-9-CM    1.  Type 2 diabetes mellitus without complication, without long-term current use of insulin ([facility]) E11.9 250.00 AMB POC HEMOGLOBIN A1C      glipiZIDE (GLUCOTROL) 5 mg tablet      CBC WITH AUTOMATED DIFF      LIPID PANEL      METABOLIC PANEL, COMPREHENSIVE      TSH 3RD GENERATION   2. Sciatica of left side M54.32 724.3 gabapentin (NEURONTIN) 100 mg capsule   3. Colon cancer screening Z12.11 V76.51 REFERRAL FOR COLONOSCOPY       AVS given.  Pt expressed understanding of instructions

## 2020-03-20 NOTE — PROGRESS NOTES
1. Have you been to the ER, urgent care clinic since your last visit? Hospitalized since your last visit? Yes, Outpatient Surgery ([facility])    2. Have you seen or consulted any other health care providers outside of the [address] [place] [address] since your last visit? Include any pap smears or colon screening.  No     Health Maintenance Due   Topic Date Due    Hepatitis C Screening  1960    Pneumococcal 0-64 years (1 of 1 - PPSV23) 07/25/1968    Foot Exam Q1  07/25/1972    Eye Exam Retinal or Dilated  07/25/1972    DTaP/Tdap/Td series (1 - Tdap) 07/25/1983    Shingrix Vaccine Age 50> (1 of 2) 07/25/2012    FOBT Q1Y Age 50-75  07/25/2012    Lipid Screen  03/12/2020

## 2020-03-21 LAB
ALBUMIN SERPL-MCNC: 4.6 G/DL (ref 3.8–4.9)
ALBUMIN/GLOB SERPL: 1.6 {RATIO} (ref 1.2–2.2)
ALP SERPL-CCNC: 100 IU/L (ref 39–117)
ALT SERPL-CCNC: 20 IU/L (ref 0–44)
AST SERPL-CCNC: 14 IU/L (ref 0–40)
BASOPHILS # BLD AUTO: 0.1 X10E3/UL (ref 0–0.2)
BASOPHILS NFR BLD AUTO: 1 %
BILIRUB SERPL-MCNC: <0.2 MG/DL (ref 0–1.2)
BUN SERPL-MCNC: 16 MG/DL (ref 6–24)
BUN/CREAT SERPL: 19 (ref 9–20)
CALCIUM SERPL-MCNC: 9.8 MG/DL (ref 8.7–10.2)
CHLORIDE SERPL-SCNC: 101 MMOL/L (ref 96–106)
CHOLEST SERPL-MCNC: 205 MG/DL (ref 100–199)
CO2 SERPL-SCNC: 23 MMOL/L (ref 20–29)
CREAT SERPL-MCNC: 0.84 MG/DL (ref 0.76–1.27)
EOSINOPHIL # BLD AUTO: 0.2 X10E3/UL (ref 0–0.4)
EOSINOPHIL NFR BLD AUTO: 3 %
ERYTHROCYTE [DISTWIDTH] IN BLOOD BY AUTOMATED COUNT: 14 % (ref 11.6–15.4)
GLOBULIN SER CALC-MCNC: 2.8 G/DL (ref 1.5–4.5)
GLUCOSE SERPL-MCNC: 198 MG/DL (ref 65–99)
HCT VFR BLD AUTO: 44.4 % (ref 37.5–51)
HDLC SERPL-MCNC: 41 MG/DL
HGB BLD-MCNC: 14.7 G/DL (ref 13–17.7)
IMM GRANULOCYTES # BLD AUTO: 0.1 X10E3/UL (ref 0–0.1)
IMM GRANULOCYTES NFR BLD AUTO: 1 %
INTERPRETATION, 910389: NORMAL
LDLC SERPL CALC-MCNC: 97 MG/DL (ref 0–99)
LYMPHOCYTES # BLD AUTO: 3.4 X10E3/UL (ref 0.7–3.1)
LYMPHOCYTES NFR BLD AUTO: 41 %
Lab: NORMAL
MCH RBC QN AUTO: 29.3 PG (ref 26.6–33)
MCHC RBC AUTO-ENTMCNC: 33.1 G/DL (ref 31.5–35.7)
MCV RBC AUTO: 89 FL (ref 79–97)
MONOCYTES # BLD AUTO: 0.8 X10E3/UL (ref 0.1–0.9)
MONOCYTES NFR BLD AUTO: 9 %
NEUTROPHILS # BLD AUTO: 3.7 X10E3/UL (ref 1.4–7)
NEUTROPHILS NFR BLD AUTO: 45 %
PLATELET # BLD AUTO: 250 X10E3/UL (ref 150–450)
POTASSIUM SERPL-SCNC: 6.4 MMOL/L (ref 3.5–5.2)
PROT SERPL-MCNC: 7.4 G/DL (ref 6–8.5)
RBC # BLD AUTO: 5.01 X10E6/UL (ref 4.14–5.8)
SODIUM SERPL-SCNC: 140 MMOL/L (ref 134–144)
TRIGL SERPL-MCNC: 334 MG/DL (ref 0–149)
TSH SERPL DL<=0.005 MIU/L-ACNC: 2.51 UIU/ML (ref 0.45–4.5)
VLDLC SERPL CALC-MCNC: 67 MG/DL (ref 5–40)
WBC # BLD AUTO: 8.3 X10E3/UL (ref 3.4–10.8)

## 2020-03-24 ENCOUNTER — TELEPHONE (OUTPATIENT)
Dept: FAMILY MEDICINE CLINIC | Age: 60
End: 2020-03-24

## 2020-03-24 DIAGNOSIS — E87.5 HYPERKALEMIA: Primary | ICD-10-CM

## 2020-03-24 NOTE — TELEPHONE ENCOUNTER
Labs reviewed. His labs look pretty good for the most part but his potassium was high. Like to recheck his blood work because this might have only been a temporary problem. Would need to do something about it if it persists though.     High potassium puts you at risk of problems with the heart's electrical system    Order future CMP

## 2020-03-24 NOTE — TELEPHONE ENCOUNTER
Called pt, verified name and . Informed pt that per Dr. Shawn Hernandez his labs look pretty good for the most part but his potassium was high. Like to recheck his blood work because this might have only been a temporary problem. Would need to do something about it if it persists though.     High potassium puts you at risk of problems with the heart's electrical system     Order future CMP. Pt stated understanding.

## 2020-04-03 ENCOUNTER — LAB ONLY (OUTPATIENT)
Dept: FAMILY MEDICINE CLINIC | Age: 60
End: 2020-04-03

## 2020-04-03 DIAGNOSIS — E87.5 HYPERKALEMIA: ICD-10-CM

## 2020-04-04 LAB
ALBUMIN SERPL-MCNC: 4.4 G/DL (ref 3.8–4.9)
ALBUMIN/GLOB SERPL: 1.6 {RATIO} (ref 1.2–2.2)
ALP SERPL-CCNC: 92 IU/L (ref 39–117)
ALT SERPL-CCNC: 17 IU/L (ref 0–44)
AST SERPL-CCNC: 15 IU/L (ref 0–40)
BILIRUB SERPL-MCNC: 0.2 MG/DL (ref 0–1.2)
BUN SERPL-MCNC: 18 MG/DL (ref 6–24)
BUN/CREAT SERPL: 21 (ref 9–20)
CALCIUM SERPL-MCNC: 9.3 MG/DL (ref 8.7–10.2)
CHLORIDE SERPL-SCNC: 100 MMOL/L (ref 96–106)
CO2 SERPL-SCNC: 19 MMOL/L (ref 20–29)
CREAT SERPL-MCNC: 0.86 MG/DL (ref 0.76–1.27)
GLOBULIN SER CALC-MCNC: 2.8 G/DL (ref 1.5–4.5)
GLUCOSE SERPL-MCNC: 223 MG/DL (ref 65–99)
POTASSIUM SERPL-SCNC: 4.6 MMOL/L (ref 3.5–5.2)
PROT SERPL-MCNC: 7.2 G/DL (ref 6–8.5)
SODIUM SERPL-SCNC: 137 MMOL/L (ref 134–144)

## 2020-04-06 ENCOUNTER — TELEPHONE (OUTPATIENT)
Dept: FAMILY MEDICINE CLINIC | Age: 60
End: 2020-04-06

## 2020-04-06 NOTE — TELEPHONE ENCOUNTER
Called pt, verified name and . Informed pt that per Dr. Ramesh Mo We repeated his blood work because of an elevated potassium. Repeat potassium looks fine. Pt stated understanding.

## 2020-06-22 ENCOUNTER — VIRTUAL VISIT (OUTPATIENT)
Dept: FAMILY MEDICINE CLINIC | Age: 60
End: 2020-06-22

## 2020-06-22 DIAGNOSIS — E11.9 TYPE 2 DIABETES MELLITUS WITHOUT COMPLICATION, WITHOUT LONG-TERM CURRENT USE OF INSULIN (HCC): Primary | ICD-10-CM

## 2020-06-22 NOTE — PROGRESS NOTES
Faxed labs to [facility] per ordering physician's order. Confirmation received.

## 2020-06-22 NOTE — PROGRESS NOTES
Allana Lesch is a 64 y.o. male evaluated via audio only technology on 6/22/2020. Consent: He and/or his health care decision maker is aware that he may receive a bill for this audio only encounter, depending on his insurance coverage, and has provided verbal consent to proceed: Yes    I communicated with the patient and/or health care decision maker about the nature and details of the following:  Assessment & Plan:     Diabetes. Fasting sugar ~180  Previous A1c 7.5 up from 6.7% up from 6.1 up from 5.5% down from 10.4% in March. metformin 1000 mg twice daily   Added glipizide 5 mg twice daily 3/20  May need to increase to 10mg based on a1c  Work on diet  3-month follow-up    Neuropathy. Left leg. Primarily involving the the top of the left foot and second third and fourth toes (L5 distribution). Bothers him at rest and at night   Gabapentin 100 mg worked wonders for a few months and then had diminishing returns. Couldn't tell that he is taking the medication  We then increase to 200 mg and he decided to only take it twice a week on bad days to prevent diminishing returns. He is happy with this approach and does not want to try a daily alternative like nortriptyline     will be getting his colonoscopy in 2 weeks  A1c to [facility]  3-month follow-up diabetes    12  Subjective: Allana Lesch is a 64 y.o. male who was seen for Diabetes (follow-up )    Recently establish care with me in 3/2019     Follow-up on diabetes. Diagnosed 15 years ago. Lost 115 pounds and was only doing diet control for a while. After a hospital stay for perforated bowel 3/2019 he was started on Lantus which we managed to wean off off over the last year.     He is currently on metformin but is forgetting to take it once every 2 weeks     Diet changed over the Christmas 2019 season that he gained 15 pounds. A1c has been trending up    Prior to Admission medications    Medication Sig Start Date End Date Taking?  Authorizing Provider   gabapentin (NEURONTIN) 100 mg capsule Take 2 Caps by mouth nightly as needed (nerve pain). Max Daily Amount: 200 mg. 3/20/20  Yes Nancy Aguero MD   glipiZIDE (GLUCOTROL) 5 mg tablet Take 1 Tab by mouth two (2) times a day. 3/20/20  Yes Nancy Aguero MD   metFORMIN (GLUCOPHAGE) 1,000 mg tablet Take 1 Tab by mouth two (2) times daily (with meals). 9/11/19  Yes Nancy Aguero MD   Insulin Needles, Disposable, 32 gauge x 5/32\" ndle by Does Not Apply route. Yes Provider, Historical   triamcinolone acetonide (KENALOG) 0.1 % topical cream Apply  to affected area two (2) times a day. use thin layer 6/19/19  Yes Nancy Aguero MD   Insulin Needles, Disposable, 31 gauge x 5/16\" ndle Inject Lantus daily 6/4/19  Yes Nancy Aguero MD   glucose blood VI test strips (BLOOD GLUCOSE TEST) strip Use to check blood sugar 3 times daily 4/12/19  Yes Nancy Aguero MD   lancets misc Test 3 times daily. 4/12/19  Yes Nancy Aguero MD     No Known Allergies      I affirm this is a Patient-Initiated Episode with a Patient who has not had a related appointment within my department in the past 7 days or scheduled within the next 24 hours.     Total Time: minutes: 21-30 minutes    Note: not billable if this call serves to triage the patient into an appointment for the relevant concern      Elen Brothers MD

## 2020-06-22 NOTE — PROGRESS NOTES
Chief Complaint   Patient presents with    Diabetes     follow-up      Health Maintenance reviewed     1. Have you been to the ER, urgent care clinic since your last visit? Hospitalized since your last visit? No     2. Have you seen or consulted any other health care providers outside of the [address] [place] [address] since your last visit? Include any pap smears or colon screening.   No

## 2020-07-08 ENCOUNTER — HOSPITAL ENCOUNTER (OUTPATIENT)
Age: 60
Setting detail: OUTPATIENT SURGERY
Discharge: HOME | End: 2020-07-08
Attending: INTERNAL MEDICINE | Admitting: INTERNAL MEDICINE
Payer: MEDICARE

## 2020-07-08 ENCOUNTER — ANESTHESIA (OUTPATIENT)
Dept: ENDOSCOPY | Age: 60
End: 2020-07-08
Payer: MEDICARE

## 2020-07-08 ENCOUNTER — ANESTHESIA EVENT (OUTPATIENT)
Dept: ENDOSCOPY | Age: 60
End: 2020-07-08
Payer: MEDICARE

## 2020-07-08 VITALS
WEIGHT: 230 LBS | DIASTOLIC BLOOD PRESSURE: 86 MMHG | BODY MASS INDEX: 32.2 KG/M2 | SYSTOLIC BLOOD PRESSURE: 148 MMHG | RESPIRATION RATE: 20 BRPM | HEART RATE: 72 BPM | OXYGEN SATURATION: 98 % | HEIGHT: 71 IN | TEMPERATURE: 97.9 F

## 2020-07-08 LAB
GLUCOSE BLD STRIP.AUTO-MCNC: 157 MG/DL (ref 65–100)
SERVICE CMNT-IMP: ABNORMAL

## 2020-07-08 PROCEDURE — 74011250636 HC RX REV CODE- 250/636: Performed by: INTERNAL MEDICINE

## 2020-07-08 PROCEDURE — 76040000019: Performed by: INTERNAL MEDICINE

## 2020-07-08 PROCEDURE — 76060000031 HC ANESTHESIA FIRST 0.5 HR: Performed by: INTERNAL MEDICINE

## 2020-07-08 PROCEDURE — 74011250636 HC RX REV CODE- 250/636: Performed by: NURSE ANESTHETIST, CERTIFIED REGISTERED

## 2020-07-08 PROCEDURE — 82962 GLUCOSE BLOOD TEST: CPT

## 2020-07-08 PROCEDURE — 74011000250 HC RX REV CODE- 250: Performed by: NURSE ANESTHETIST, CERTIFIED REGISTERED

## 2020-07-08 RX ORDER — SODIUM CHLORIDE 0.9 % (FLUSH) 0.9 %
5-40 SYRINGE (ML) INJECTION AS NEEDED
Status: DISCONTINUED | OUTPATIENT
Start: 2020-07-08 | End: 2020-07-08 | Stop reason: HOSPADM

## 2020-07-08 RX ORDER — LIDOCAINE HYDROCHLORIDE 20 MG/ML
INJECTION, SOLUTION EPIDURAL; INFILTRATION; INTRACAUDAL; PERINEURAL AS NEEDED
Status: DISCONTINUED | OUTPATIENT
Start: 2020-07-08 | End: 2020-07-08 | Stop reason: HOSPADM

## 2020-07-08 RX ORDER — FLUMAZENIL 0.1 MG/ML
0.2 INJECTION INTRAVENOUS
Status: DISCONTINUED | OUTPATIENT
Start: 2020-07-08 | End: 2020-07-08 | Stop reason: HOSPADM

## 2020-07-08 RX ORDER — SODIUM CHLORIDE 0.9 % (FLUSH) 0.9 %
5-40 SYRINGE (ML) INJECTION EVERY 8 HOURS
Status: DISCONTINUED | OUTPATIENT
Start: 2020-07-08 | End: 2020-07-08 | Stop reason: HOSPADM

## 2020-07-08 RX ORDER — SODIUM CHLORIDE 9 MG/ML
INJECTION, SOLUTION INTRAVENOUS
Status: DISCONTINUED | OUTPATIENT
Start: 2020-07-08 | End: 2020-07-08 | Stop reason: HOSPADM

## 2020-07-08 RX ORDER — NALOXONE HYDROCHLORIDE 0.4 MG/ML
0.4 INJECTION, SOLUTION INTRAMUSCULAR; INTRAVENOUS; SUBCUTANEOUS
Status: DISCONTINUED | OUTPATIENT
Start: 2020-07-08 | End: 2020-07-08 | Stop reason: HOSPADM

## 2020-07-08 RX ORDER — SODIUM CHLORIDE 9 MG/ML
50 INJECTION, SOLUTION INTRAVENOUS CONTINUOUS
Status: DISCONTINUED | OUTPATIENT
Start: 2020-07-08 | End: 2020-07-08 | Stop reason: HOSPADM

## 2020-07-08 RX ORDER — PROPOFOL 10 MG/ML
INJECTION, EMULSION INTRAVENOUS AS NEEDED
Status: DISCONTINUED | OUTPATIENT
Start: 2020-07-08 | End: 2020-07-08 | Stop reason: HOSPADM

## 2020-07-08 RX ORDER — DEXTROMETHORPHAN/PSEUDOEPHED 2.5-7.5/.8
1.2 DROPS ORAL
Status: DISCONTINUED | OUTPATIENT
Start: 2020-07-08 | End: 2020-07-08 | Stop reason: HOSPADM

## 2020-07-08 RX ORDER — EPINEPHRINE 0.1 MG/ML
1 INJECTION INTRACARDIAC; INTRAVENOUS
Status: DISCONTINUED | OUTPATIENT
Start: 2020-07-08 | End: 2020-07-08 | Stop reason: HOSPADM

## 2020-07-08 RX ORDER — ATROPINE SULFATE 0.1 MG/ML
0.5 INJECTION INTRAVENOUS
Status: DISCONTINUED | OUTPATIENT
Start: 2020-07-08 | End: 2020-07-08 | Stop reason: HOSPADM

## 2020-07-08 RX ADMIN — PROPOFOL 100 MG: 10 INJECTION, EMULSION INTRAVENOUS at 07:28

## 2020-07-08 RX ADMIN — SODIUM CHLORIDE: 900 INJECTION, SOLUTION INTRAVENOUS at 07:26

## 2020-07-08 RX ADMIN — LIDOCAINE HYDROCHLORIDE 40 MG: 20 INJECTION, SOLUTION EPIDURAL; INFILTRATION; INTRACAUDAL; PERINEURAL at 07:28

## 2020-07-08 RX ADMIN — PROPOFOL 50 MG: 10 INJECTION, EMULSION INTRAVENOUS at 07:29

## 2020-07-08 RX ADMIN — PROPOFOL 30 MG: 10 INJECTION, EMULSION INTRAVENOUS at 07:32

## 2020-07-08 NOTE — PROGRESS NOTES
I have reviewed discharge instructions with the patient. He verbalized understanding. I instructed him to go over them again once he gets home.

## 2020-07-08 NOTE — ANESTHESIA POSTPROCEDURE EVALUATION
Procedure(s):  COLONOSCOPY. MAC    <BSHSIANPOST>    INITIAL Post-op Vital signs:   Vitals Value Taken Time   BP 0/0 7/8/2020  7:50 AM   Temp     Pulse 78 7/8/2020  7:50 AM   Resp 0 7/8/2020  7:50 AM   SpO2 98 % 7/8/2020  7:50 AM   Vitals shown include unvalidated device data.

## 2020-07-08 NOTE — DISCHARGE INSTRUCTIONS
Matt 66  [address] [facility], [address] [place] [address],[address]  [id number]  1960    COLON DISCHARGE INSTRUCTIONS    DISCOMFORT:  Redness at IV site- apply warm compress to area; if redness or soreness persist- contact your physician  There may be a slight amount of blood passed from the rectum  Gaseous discomfort- walking, belching will help relieve any discomfort  You may not operate a vehicle for 12 hours  You may not engage in an occupation involving machinery or appliances for rest of today  You may not drink alcoholic beverages for at least 12 hours  Avoid making any critical decisions for at least 24 hour  DIET:   Regular diet. - however -  remember your colon is empty and a heavy meal will produce gas. Avoid these foods:  vegetables, fried / greasy foods, carbonated drinks for today         ACTIVITY:  You may resume your normal daily activities it is recommended that you spend the remainder of the day resting -  avoid any strenuous activity. CALL M.D. ANY SIGN OF:   Increasing pain, nausea, vomiting  Abdominal distension (swelling)  New increased bleeding (oral or rectal)  Fever (chills)  Pain in chest area  Bloody discharge from nose or mouth  Shortness of breath     Follow-up Instructions:   Call Dr. He Hill for any questions or problems. Telephone # [phone]  Should have a repeat colonoscopy in 10 years    Impression:  1)Right hemicolectomy      Learning About Coronavirus (COVID-19)  Coronavirus (COVID-19): Overview  What is coronavirus (COVID-19)? The coronavirus disease (COVID-19) is caused by a virus. It is an illness that was first found in [place], [place], in December 2019. It has since spread worldwide. The virus can cause fever, cough, and trouble breathing. In severe cases, it can cause pneumonia and make it hard to breathe without help. It can cause death. Coronaviruses are a large group of viruses. They cause the common cold.  They also cause more serious illnesses like Middle East respiratory syndrome (MERS) and severe acute respiratory syndrome (SARS). COVID-19 is caused by a novel coronavirus. That means it's a new type that has not been seen in people before. This virus spreads person-to-person through droplets from coughing and sneezing. It can also spread when you are close to someone who is infected. And it can spread when you touch something that has the virus on it, such as a doorknob or a tabletop. What can you do to protect yourself from coronavirus (COVID-19)? The best way to protect yourself from getting sick is to:  · Avoid areas where there is an outbreak. · Avoid contact with people who may be infected. · Wash your hands often with soap or alcohol-based hand sanitizers. · Avoid crowds and try to stay at least 6 feet away from other people. · Wash your hands often, especially after you cough or sneeze. Use soap and water, and scrub for at least 20 seconds. If soap and water aren't available, use an alcohol-based hand . · Avoid touching your mouth, nose, and eyes. What can you do to avoid spreading the virus to others? To help avoid spreading the virus to others:  · Cover your mouth with a tissue when you cough or sneeze. Then throw the tissue in the trash. · Use a disinfectant to clean things that you touch often. · Stay home if you are sick or have been exposed to the virus. Don't go to school, work, or public areas. And don't use public transportation. · If you are sick:  ? Leave your home only if you need to get medical care. But call the doctor's office first so they know you're coming. And wear a face mask, if you have one.  ? If you have a face mask, wear it whenever you're around other people. It can help stop the spread of the virus when you cough or sneeze. ? Clean and disinfect your home every day. Use household  and disinfectant wipes or sprays.  Take special care to clean things that you grab with your hands. These include doorknobs, remote controls, phones, and handles on your refrigerator and microwave. And don't forget countertops, tabletops, bathrooms, and computer keyboards. When to call for help  Call 911 anytime you think you may need emergency care. For example, call if:  · You have severe trouble breathing. (You can't talk at all.)  · You have constant chest pain or pressure. · You are severely dizzy or lightheaded. · You are confused or can't think clearly. · Your face and lips have a blue color. · You pass out (lose consciousness) or are very hard to wake up. Call your doctor now if you develop symptoms such as:  · Shortness of breath. · Fever. · Cough. If you need to get care, call ahead to the doctor's office for instructions before you go. Make sure you wear a face mask, if you have one, to prevent exposing other people to the virus. Where can you get the latest information? The following health organizations are tracking and studying this virus. Their websites contain the most up-to-date information. Eliana Daly also learn what to do if you think you may have been exposed to the virus. · U.S. Centers for Disease Control and Prevention (CDC): The CDC provides updated news about the disease and travel advice. The website also tells you how to prevent the spread of infection. www.cdc.gov  · World Health Organization [facility]): WHO offers information about the virus outbreaks. WHO also has travel advice. www.who.int  Current as of: April 1, 2020               Content Version: 12.4  © 2006-2020 [employer]wise[employer] Incorporated. Care instructions adapted under license by your healthcare professional. If you have questions about a medical condition or this instruction, always ask your healthcare professional. [address] any warranty or liability for your use of this information.

## 2020-07-08 NOTE — PROCEDURES
[address]              :  Judy Koroma MD    Surgical Assistant: None    Implants: None    Referring Provider: Jeny Zapien MD    Sedation:  MAC anesthesia Propofol        Prior to the procedure its objectives, risks, consequences and alternatives were discussed with the patient who then elected to proceed. The patient had the opportunity to ask questions and those questions were answered. A physical exam was performed. The heart, lungs, and mental status were examined prior to the procedure and found to be satisfactory for conscious sedation and for the procedure. Conscious sedation was initiated by the physician. Continuous pulse oximetry and blood pressure monitoring were used throughout the procedure. After appropriate analgesia and rectal exam, the colonoscope was passed into the anus and passed to the anastomosis without difficulty. The prep was good. On slow withdrawal of the scope, the anastomosis,, transverse colon, splenic flexure, descending colon, sigmoid and rectum were normal. Retroflexion exam of the rectum was normal. He tolerated the procedure without complication and I recommend a repeat colonoscopy in 10 years. Specimen none    Complications: None. EBL:  None.     Judy Koroma MD  7/8/2020  7:43 AM

## 2020-07-08 NOTE — PERIOP NOTES

## 2020-07-08 NOTE — ANESTHESIA PREPROCEDURE EVALUATION
Relevant Problems   No relevant active problems       Anesthetic History   No history of anesthetic complications            Review of Systems / Medical History  Patient summary reviewed, nursing notes reviewed and pertinent labs reviewed    Pulmonary  Within defined limits                 Neuro/Psych   Within defined limits           Cardiovascular  Within defined limits                     GI/Hepatic/Renal  Within defined limits              Endo/Other  Within defined limits  Diabetes         Other Findings              Physical Exam    Airway  Mallampati: II  TM Distance: > 6 cm  Neck ROM: normal range of motion   Mouth opening: Normal     Cardiovascular  Regular rate and rhythm,  S1 and S2 normal,  no murmur, click, rub, or gallop             Dental  No notable dental hx       Pulmonary  Breath sounds clear to auscultation               Abdominal  GI exam deferred       Other Findings            Anesthetic Plan    ASA: 2  Anesthesia type: MAC            Anesthetic plan and risks discussed with: Patient

## 2020-07-08 NOTE — ROUTINE PROCESS
[facility] 1960 
[id number] Situation: 
Verbal report received from: Hershal Rubinstein Procedure: Procedure(s): 
COLONOSCOPY Background: 
 
Preoperative diagnosis: SCREENING Postoperative diagnosis: 1)Right hemicolectomy :  Dr. Caesar Iniguez Assistant(s): Endoscopy Technician-1: Saul Abrams Endoscopy RN-1: Jossue Betancur RN Endoscopy RN-2: Erick Roland RN Specimens: * No specimens in log * H. Pylori  no Assessment: 
Intra-procedure medications Anesthesia gave intra-procedure sedation and medications, see anesthesia flow sheet yes Intravenous fluids: NS@ [facility] Vital signs stable Abdominal assessment: round and soft Recommendation: 
Discharge patient per MD order. Family or Friend Son Jaye Tom Permission to share finding with family or friend yes

## 2020-07-08 NOTE — H&P
Matt 66  [facility]  64 y.[facility].  male who presents with no symptoms for screening. He had right hemicolectomy in 2018 for probable diverticulitis    . 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    Ex-smoker 2017     Past Surgical History:   Procedure Laterality Date    HX COLECTOMY      HX ORTHOPAEDIC       No Known Allergies  Current Facility-Administered Medications   Medication Dose Route Frequency Provider Last Rate Last Dose    0.9% sodium chloride infusion  50 mL/hr IntraVENous CONTINUOUS Marcellus Odell MD        sodium chloride (NS) flush 5-40 mL  5-40 mL IntraVENous Q8H Marcellus Odell MD        sodium chloride (NS) flush 5-40 mL  5-40 mL IntraVENous PRN Marcellus Odell MD        [facility]) injection 0.4 mg  0.4 mg IntraVENous Multiple Marcellus Odell MD        flumazeniL (ROMAZICON) 0.1 mg/mL injection 0.2 mg  0.2 mg IntraVENous Estella Odell MD        simethicone (MYLICON) 64RT/3.7OQ oral drops 80 mg  1.2 mL Oral Estella Odell MD        atropine injection 0.5 mg  0.5 mg IntraVENous ONCE PRN Marcellus Odell MD        EPINEPHrine (ADRENALIN) 0.1 mg/mL syringe 1 mg  1 mg Endoscopically ONCE PRN Marcellus Odell MD           Visit Vitals  /80   Pulse 72   Temp 98 °F (36.7 °C)   Resp 16   Ht 5' 11\" (1.803 m)   Wt 104.3 kg (230 lb)   SpO2 98%   BMI 32.08 kg/m²       The patient was counseled at length about the risks of annamaria Covid-19 in the carly-operative and post-operative states including the recovery window of their procedure. The patient was made aware that annamaria Covid-19 after a surgical procedure may worsen their prognosis for recovering from the virus and lend to a higher morbidity and or mortality risk. The patient was given the options of postponing their procedure.  All of the risks, benefits, and alternatives were discussed. The patient does  wish to proceed with the procedure. PHYSICAL EXAM:  General: WD, WN. Alert, cooperative, no acute distress    HEENT: NC, Atraumatic. PERRLA, EOMI. Anicteric sclerae. Mallampati score 2  Lungs:  CTA Bilaterally. No Wheezing/Rhonchi/Rales. Heart:  Regular  rhythm,  No murmur (), No Rubs, No Gallops  Abdomen: Soft, Non distended, Non tender. +Bowel sounds, no HSM  Extremities: No c/c/e  Neurologic:  CN 2-12 gi, Alert and oriented X 3. No acute neurological distress   Psych:   Good insight. Not anxious nor agitated. Plan:   Endoscopic procedure with MAC.     Joe Mirza MD  7/8/2020  7:25 AM

## 2020-09-11 RX ORDER — METFORMIN HYDROCHLORIDE 1000 MG/1
TABLET ORAL
Qty: 180 TAB | Refills: 3 | Status: SHIPPED | OUTPATIENT
Start: 2020-09-11 | End: 2021-08-26

## 2020-12-20 DIAGNOSIS — M54.32 SCIATICA OF LEFT SIDE: ICD-10-CM

## 2020-12-21 RX ORDER — GABAPENTIN 100 MG/1
CAPSULE ORAL
Qty: 180 CAP | Refills: 1 | Status: SHIPPED | OUTPATIENT
Start: 2020-12-21

## 2021-01-06 ENCOUNTER — TELEPHONE (OUTPATIENT)
Dept: FAMILY MEDICINE CLINIC | Age: 61
End: 2021-01-06

## 2021-01-06 ENCOUNTER — HOSPITAL ENCOUNTER (OUTPATIENT)
Dept: LAB | Age: 61
Discharge: HOME | End: 2021-01-06
Payer: MEDICARE

## 2021-01-06 ENCOUNTER — OFFICE VISIT (OUTPATIENT)
Dept: FAMILY MEDICINE CLINIC | Age: 61
End: 2021-01-06
Payer: MEDICARE

## 2021-01-06 VITALS
HEART RATE: 83 BPM | BODY MASS INDEX: 34.66 KG/M2 | HEIGHT: 71 IN | OXYGEN SATURATION: 96 % | SYSTOLIC BLOOD PRESSURE: 156 MMHG | WEIGHT: 247.6 LBS | DIASTOLIC BLOOD PRESSURE: 85 MMHG | RESPIRATION RATE: 16 BRPM

## 2021-01-06 DIAGNOSIS — E11.628 DIABETIC FOOT INFECTION (HCC): ICD-10-CM

## 2021-01-06 DIAGNOSIS — M86.9 OSTEOMYELITIS OF LEFT FOOT, UNSPECIFIED TYPE (HCC): ICD-10-CM

## 2021-01-06 DIAGNOSIS — E11.9 TYPE 2 DIABETES MELLITUS WITHOUT COMPLICATION, WITHOUT LONG-TERM CURRENT USE OF INSULIN (HCC): Primary | ICD-10-CM

## 2021-01-06 DIAGNOSIS — L08.9 DIABETIC FOOT INFECTION (HCC): ICD-10-CM

## 2021-01-06 DIAGNOSIS — Z23 ENCOUNTER FOR IMMUNIZATION: ICD-10-CM

## 2021-01-06 PROCEDURE — 85025 COMPLETE CBC W/AUTO DIFF WBC: CPT

## 2021-01-06 PROCEDURE — G9711 PT HX TOT COL OR COLON CA: HCPCS | Performed by: FAMILY MEDICINE

## 2021-01-06 PROCEDURE — G8417 CALC BMI ABV UP PARAM F/U: HCPCS | Performed by: FAMILY MEDICINE

## 2021-01-06 PROCEDURE — G8427 DOCREV CUR MEDS BY ELIG CLIN: HCPCS | Performed by: FAMILY MEDICINE

## 2021-01-06 PROCEDURE — 90686 IIV4 VACC NO PRSV 0.5 ML IM: CPT | Performed by: FAMILY MEDICINE

## 2021-01-06 PROCEDURE — 2022F DILAT RTA XM EVC RTNOPTHY: CPT | Performed by: FAMILY MEDICINE

## 2021-01-06 PROCEDURE — G8510 SCR DEP NEG, NO PLAN REQD: HCPCS | Performed by: FAMILY MEDICINE

## 2021-01-06 PROCEDURE — 36415 COLL VENOUS BLD VENIPUNCTURE: CPT

## 2021-01-06 PROCEDURE — G0463 HOSPITAL OUTPT CLINIC VISIT: HCPCS | Performed by: FAMILY MEDICINE

## 2021-01-06 PROCEDURE — 80061 LIPID PANEL: CPT

## 2021-01-06 PROCEDURE — 3046F HEMOGLOBIN A1C LEVEL >9.0%: CPT | Performed by: FAMILY MEDICINE

## 2021-01-06 PROCEDURE — 99214 OFFICE O/P EST MOD 30 MIN: CPT | Performed by: FAMILY MEDICINE

## 2021-01-06 PROCEDURE — 80053 COMPREHEN METABOLIC PANEL: CPT

## 2021-01-06 PROCEDURE — 83036 HEMOGLOBIN GLYCOSYLATED A1C: CPT

## 2021-01-06 RX ORDER — AMOXICILLIN AND CLAVULANATE POTASSIUM 875; 125 MG/1; MG/1
1 TABLET, FILM COATED ORAL EVERY 12 HOURS
Qty: 20 TAB | Refills: 0 | Status: SHIPPED | OUTPATIENT
Start: 2021-01-06 | End: 2021-01-13 | Stop reason: SDUPTHER

## 2021-01-06 RX ORDER — SULFAMETHOXAZOLE AND TRIMETHOPRIM 800; 160 MG/1; MG/1
1 TABLET ORAL 2 TIMES DAILY
Qty: 20 TAB | Refills: 0 | Status: SHIPPED | OUTPATIENT
Start: 2021-01-06 | End: 2021-01-13 | Stop reason: SDUPTHER

## 2021-01-06 NOTE — TELEPHONE ENCOUNTER
Dr Sandra Mitchell office requesting office notes and most recent lab work. Please fax to  [phone]. Letta Comment can be reached until 3pm today.  Patient's appt is tomorrow, please call if there are any questions

## 2021-01-06 NOTE — PROGRESS NOTES
PEDRO LUIS Padron is a 64 y.o. male who presents with an infected toe left second toe pictured below. Started 1 week ago that he first noticed it. He thinks that he normally has sensation in this toe and has not noticed that this toe is painful. He has not noticed a fever. He has noticed that the redness spread since first onset. He has diabetes, has not checked his blood sugar recently. 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 20 Tab 0    trimethoprim-sulfamethoxazole (BACTRIM DS, SEPTRA DS) 160-800 mg per tablet Take 1 Tab by mouth two (2) times a day for 10 days. 20 Tab 0    gabapentin (NEURONTIN) 100 mg capsule TAKE 2 CAPSULES BY MOUTH EVERY NIGHT AS NEEDED FOR NERVE PAIN. MAX DAILY AMOUNT: 200  Cap 1    metFORMIN (GLUCOPHAGE) 1,000 mg tablet TAKE 1 TABLET BY MOUTH TWICE A DAY WITH MEALS 180 Tab 3    glipiZIDE (GLUCOTROL) 5 mg tablet Take 1 Tab by mouth two (2) times a day. 180 Tab 3    Insulin Needles, Disposable, 32 gauge x 5/32\" ndle by Does Not Apply route.  triamcinolone acetonide (KENALOG) 0.1 % topical cream Apply  to affected area two (2) times a day. use thin layer 80 g 3    Insulin Needles, Disposable, 31 gauge x 5/16\" ndle Inject Lantus daily 1 Package 11    glucose blood VI test strips (BLOOD GLUCOSE TEST) strip Use to check blood sugar 3 times daily 200 Strip 11    lancets misc Test 3 times daily.  200 Each 11       Allergies:   No Known Allergies    Smoker:  Social History     Tobacco Use   Smoking Status Former Smoker    Quit date: 2019    Years since quittin.8   Smokeless Tobacco Never Used       ETOH:   Social History     Substance and Sexual Activity Alcohol Use No    Frequency: Never       FH:   Family History   Problem Relation Age of Onset    Pneumonia Mother     Heart Disease Father        ROS:   As listed in HPI. In addition:  Constitutional:   No headache, fever, fatigue, weight loss or weight gain      Cardiac:    No chest pain      Resp:   No cough or shortness of breath      Neuro   No loss of consciousness, dizziness, seizures      Physical Exam:  Blood pressure (!) 156/85, pulse 83, resp. rate 16, height 5' 11\" (1.803 m), weight 247 lb 9.6 oz (112.3 kg), SpO2 96 %. GEN: No apparent distress. Alert and oriented and responds to all questions appropriately. NEUROLOGIC:  No focal neurologic deficits. Strength and sensation grossly intact. Coordination and gait grossly intact. EXT: Well perfused. No edema. SKIN: No obvious rashes. Toe pictured below. There is an ulcer on the bottom that looks a little more chronic so I suspect that he has less sensation in this toe than he realized and this could be a more chronic issue. Toe is swollen and red. There is erythema extending into the forefoot that is difficult to appreciate on the image                   Assessment and Plan     Diabetic foot infection, diabetic ulcer  Concern for osteomyelitis  Bactrim and Augmentin    Spoke with Dr. An [place] office. They can get him into be seen tomorrow. Xray can be done at their office. They asked that I order  an MRI to be done    I would like him to follow-up with me early next week    Note elevated BP  This is unusual.  We will monitor  Could be acute illness but also could be the 15 pound weight gain over the last 6 months      ICD-10-CM ICD-9-CM    1. Type 2 diabetes mellitus without complication, without long-term current use of insulin (HCC)  E11.9 250.00 LIPID PANEL      CBC WITH AUTOMATED DIFF      METABOLIC PANEL, COMPREHENSIVE      HEMOGLOBIN A1C WITH EAG   2.  Diabetic foot infection ([facility])  E11.628 250.80 amoxicillin-clavulanate (AUGMENTIN) 875-125 mg per tablet    L08.9 686.9 trimethoprim-sulfamethoxazole (BACTRIM DS, SEPTRA DS) 160-800 mg per tablet      XR FOOT LT MIN 3 V      REFERRAL TO PODIATRY      MRI FOOT LT W WO CONT   3. Encounter for immunization  Z23 V03.89 INFLUENZA VIRUS VAC QUAD,SPLIT,PRESV FREE SYRINGE IM   4. [facility]omyel[facility] of left foot, unspecified type ([facility]  M86.9 730.27 MRI FOOT LT W WO CONT       AVS given.  Pt expressed understanding of instructions

## 2021-01-06 NOTE — PATIENT INSTRUCTIONS
Vaccine Information Statement Influenza (Flu) Vaccine (Inactivated or Recombinant): What You Need to Know Many Vaccine Information Statements are available in Khmer and other languages. See www.immunize.org/vis Hojas de información sobre vacunas están disponibles en español y en muchos otros idiomas. Visite www.immunize.org/vis 1. Why get vaccinated? Influenza vaccine can prevent influenza (flu). Flu is a contagious disease that spreads around the [facility] every year, usually between October and May. Anyone can get the flu, but it is more dangerous for some people. Infants and young children, people 72years of age and older, pregnant women, and people with certain health conditions or a weakened immune system are at greatest risk of flu complications. Pneumonia, bronchitis, sinus infections and ear infections are examples of flu-related complications. If you have a medical condition, such as heart disease, cancer or diabetes, flu can make it worse. Flu can cause fever and chills, sore throat, muscle aches, fatigue, cough, headache, and runny or stuffy nose. Some people may have vomiting and diarrhea, though this is more common in children than adults. Each year thousands of people in the [facility] die from flu, and many more are hospitalized. Flu vaccine prevents millions of illnesses and flu-related visits to the doctor each year. 2. Influenza vaccines CDC recommends everyone 10months of age and older get vaccinated every flu season. Children 6 months through 6years of age may need 2 doses during a single flu season. Everyone else needs only 1 dose each flu season. It takes about 2 weeks for protection to develop after vaccination. There are many flu viruses, and they are always changing. Each year a new flu vaccine is made to protect against three or four viruses that are likely to cause disease in the upcoming flu season. Even when the vaccine doesnt exactly match these viruses, it may still provide some protection. Influenza vaccine does not cause flu. Influenza vaccine may be given at the same time as other vaccines. 3. Talk with your health care provider Tell your vaccine provider if the person getting the vaccine: 
 Has had an allergic reaction after a previous dose of influenza vaccine, or has any severe, life-threatening allergies.  Has ever had Guillain-Barré Syndrome (also called GBS). In some cases, your health care provider may decide to postpone influenza vaccination to a future visit. People with minor illnesses, such as a cold, may be vaccinated. People who are moderately or severely ill should usually wait until they recover before getting influenza vaccine. Your health care provider can give you more information. 4. Risks of a reaction  Soreness, redness, and swelling where shot is given, fever, muscle aches, and headache can happen after influenza vaccine.  There may be a very small increased risk of Guillain-Barré Syndrome (GBS) after inactivated influenza vaccine (the flu shot). [place] Fulling children who get the flu shot along with pneumococcal vaccine (PCV13), and/or DTaP vaccine at the same time might be slightly more likely to have a seizure caused by fever. Tell your health care provider if a child who is getting flu vaccine has ever had a seizure. People sometimes faint after medical procedures, including vaccination. Tell your provider if you feel dizzy or have vision changes or ringing in the ears. As with any medicine, there is a very remote chance of a vaccine causing a severe allergic reaction, other serious injury, or death. 5. What if there is a serious problem? An allergic reaction could occur after the vaccinated person leaves the clinic. If you see signs of a severe allergic reaction (hives, swelling of the face and throat, difficulty breathing, a fast heartbeat, dizziness, or weakness), call 9-1-1 and get the person to the nearest hospital. 
 
For other signs that concern you, call your health care provider. 
 
Adverse reactions should be reported to the Vaccine Adverse Event Reporting System (VAERS). Your health care provider will usually file this report, or you can do it yourself. Visit the VAERS website at www.vaers.hhs.gov or call 1-667.193.2160.  VAERS is only for reporting reactions, and VAERS staff do not give medical advice. 
 
6. The National Vaccine Injury Compensation Program 
 
The National Vaccine Injury Compensation Program (VICP) is a federal program that was created to compensate people who may have been injured by certain vaccines. Visit the VICP website at www.hrsa.gov/vaccinecompensation or call 1-459.335.9848 to learn about the program and about filing a claim. There is a time limit to file a claim for compensation. 
 
7. How can I learn more? 
 
• Ask your health care provider.  
• Call your local or state health department. 
• Contact the Centers for Disease Control and Prevention (CDC): 
- Call 1-585.945.9026 (2-431-DVE-INFO) or 
- Visit CDC’s influenza website at www.cdc.gov/flu 
 
Vaccine Information Statement (Interim) 
Inactivated Influenza Vaccine  
8/15/2019 
42 U.S.C. § 300aa-26  
Department of Health and Human Services 
Centers for Disease Control and Prevention 
 
Office Use Only

## 2021-01-06 NOTE — PROGRESS NOTES
Room: 3    Identified pt with two pt identifiers(name and ). Reviewed record in preparation for visit and have obtained necessary documentation. All patient medications has been reviewed. Chief Complaint   Patient presents with    Foot Pain     ingrown toenail; left foot; second toe; reddened/swollen    Abdominal Pain     x2 week; LLQ       Health Maintenance Due   Topic    Hepatitis C Screening     Pneumococcal 0-64 years (1 of 1 - PPSV23)    Foot Exam Q1     Eye Exam Retinal or Dilated     DTaP/Tdap/Td series (1 - Tdap)    Shingrix Vaccine Age 50> (1 of 2)    MICROALBUMIN Q1     Medicare Yearly Exam     Flu Vaccine (1)       Vitals:    21 1112   BP: (!) 169/91   Pulse: 83   Resp: 16   SpO2: 96%   Weight: 247 lb 9.6 oz (112.3 kg)   Height: 5' 11\" (1.803 m)   PainSc:   3   PainLoc: Foot       4. Have you been to the ER, urgent care clinic since your last visit? Hospitalized since your last visit? No    5. Have you seen or consulted any other health care providers outside of the [address] [place] [address] since your last visit? Include any pap smears or colon screening. No    6. Would you like to receive your flu shot today? YES    Patient is accompanied by self I have received verbal consent from Ramon Brink to discuss any/all medical information while they are present in the room.

## 2021-01-07 LAB
ALBUMIN SERPL-MCNC: 4.3 G/DL (ref 3.8–4.9)
ALBUMIN/GLOB SERPL: 1.4 {RATIO} (ref 1.2–2.2)
ALP SERPL-CCNC: 103 IU/L (ref 39–117)
ALT SERPL-CCNC: 22 IU/L (ref 0–44)
AST SERPL-CCNC: 22 IU/L (ref 0–40)
BASOPHILS # BLD AUTO: 0.1 X10E3/UL (ref 0–0.2)
BASOPHILS NFR BLD AUTO: 1 %
BILIRUB SERPL-MCNC: 0.3 MG/DL (ref 0–1.2)
BUN SERPL-MCNC: 11 MG/DL (ref 6–24)
BUN/CREAT SERPL: 15 (ref 9–20)
CALCIUM SERPL-MCNC: 9.7 MG/DL (ref 8.7–10.2)
CHLORIDE SERPL-SCNC: 96 MMOL/L (ref 96–106)
CHOLEST SERPL-MCNC: 255 MG/DL (ref 100–199)
CO2 SERPL-SCNC: 26 MMOL/L (ref 20–29)
CREAT SERPL-MCNC: 0.74 MG/DL (ref 0.76–1.27)
EOSINOPHIL # BLD AUTO: 0.2 X10E3/UL (ref 0–0.4)
EOSINOPHIL NFR BLD AUTO: 2 %
ERYTHROCYTE [DISTWIDTH] IN BLOOD BY AUTOMATED COUNT: 13.1 % (ref 11.6–15.4)
EST. AVERAGE GLUCOSE BLD GHB EST-MCNC: 243 MG/DL
GLOBULIN SER CALC-MCNC: 3.1 G/DL (ref 1.5–4.5)
GLUCOSE SERPL-MCNC: 262 MG/DL (ref 65–99)
HBA1C MFR BLD: 10.1 % (ref 4.8–5.6)
HCT VFR BLD AUTO: 45.6 % (ref 37.5–51)
HDLC SERPL-MCNC: 32 MG/DL
HGB BLD-MCNC: 15.5 G/DL (ref 13–17.7)
IMM GRANULOCYTES # BLD AUTO: 0 X10E3/UL (ref 0–0.1)
IMM GRANULOCYTES NFR BLD AUTO: 1 %
INTERPRETATION, 910389: NORMAL
LDLC SERPL CALC-MCNC: ABNORMAL MG/DL (ref 0–99)
LYMPHOCYTES # BLD AUTO: 2.8 X10E3/UL (ref 0.7–3.1)
LYMPHOCYTES NFR BLD AUTO: 36 %
Lab: NORMAL
MCH RBC QN AUTO: 31.4 PG (ref 26.6–33)
MCHC RBC AUTO-ENTMCNC: 34 G/DL (ref 31.5–35.7)
MCV RBC AUTO: 93 FL (ref 79–97)
MONOCYTES # BLD AUTO: 0.7 X10E3/UL (ref 0.1–0.9)
MONOCYTES NFR BLD AUTO: 9 %
NEUTROPHILS # BLD AUTO: 4.1 X10E3/UL (ref 1.4–7)
NEUTROPHILS NFR BLD AUTO: 51 %
PLATELET # BLD AUTO: 232 X10E3/UL (ref 150–450)
POTASSIUM SERPL-SCNC: 4.9 MMOL/L (ref 3.5–5.2)
PROT SERPL-MCNC: 7.4 G/DL (ref 6–8.5)
RBC # BLD AUTO: 4.93 X10E6/UL (ref 4.14–5.8)
SODIUM SERPL-SCNC: 136 MMOL/L (ref 134–144)
TRIGL SERPL-MCNC: 1055 MG/DL (ref 0–149)
VLDLC SERPL CALC-MCNC: ABNORMAL MG/DL (ref 5–40)
WBC # BLD AUTO: 8 X10E3/UL (ref 3.4–10.8)

## 2021-01-13 ENCOUNTER — OFFICE VISIT (OUTPATIENT)
Dept: FAMILY MEDICINE CLINIC | Age: 61
End: 2021-01-13
Payer: MEDICARE

## 2021-01-13 VITALS
DIASTOLIC BLOOD PRESSURE: 78 MMHG | OXYGEN SATURATION: 95 % | WEIGHT: 248 LBS | RESPIRATION RATE: 17 BRPM | HEART RATE: 80 BPM | HEIGHT: 71 IN | TEMPERATURE: 97.1 F | BODY MASS INDEX: 34.72 KG/M2 | SYSTOLIC BLOOD PRESSURE: 131 MMHG

## 2021-01-13 DIAGNOSIS — L08.9 DIABETIC FOOT INFECTION (HCC): ICD-10-CM

## 2021-01-13 DIAGNOSIS — E11.8 TYPE 2 DIABETES MELLITUS WITH COMPLICATION (HCC): ICD-10-CM

## 2021-01-13 DIAGNOSIS — E11.9 TYPE 2 DIABETES MELLITUS WITHOUT COMPLICATION, WITHOUT LONG-TERM CURRENT USE OF INSULIN (HCC): Primary | ICD-10-CM

## 2021-01-13 DIAGNOSIS — E78.1 HYPERTRIGLYCERIDEMIA: ICD-10-CM

## 2021-01-13 DIAGNOSIS — E11.628 DIABETIC FOOT INFECTION (HCC): ICD-10-CM

## 2021-01-13 PROCEDURE — G0463 HOSPITAL OUTPT CLINIC VISIT: HCPCS | Performed by: FAMILY MEDICINE

## 2021-01-13 PROCEDURE — G8427 DOCREV CUR MEDS BY ELIG CLIN: HCPCS | Performed by: FAMILY MEDICINE

## 2021-01-13 PROCEDURE — 99214 OFFICE O/P EST MOD 30 MIN: CPT | Performed by: FAMILY MEDICINE

## 2021-01-13 PROCEDURE — 2022F DILAT RTA XM EVC RTNOPTHY: CPT | Performed by: FAMILY MEDICINE

## 2021-01-13 PROCEDURE — 3046F HEMOGLOBIN A1C LEVEL >9.0%: CPT | Performed by: FAMILY MEDICINE

## 2021-01-13 PROCEDURE — G9711 PT HX TOT COL OR COLON CA: HCPCS | Performed by: FAMILY MEDICINE

## 2021-01-13 PROCEDURE — G8510 SCR DEP NEG, NO PLAN REQD: HCPCS | Performed by: FAMILY MEDICINE

## 2021-01-13 PROCEDURE — G8417 CALC BMI ABV UP PARAM F/U: HCPCS | Performed by: FAMILY MEDICINE

## 2021-01-13 RX ORDER — INSULIN GLARGINE 100 [IU]/ML
30 INJECTION, SOLUTION SUBCUTANEOUS
Qty: 27 ML | Refills: 3 | Status: SHIPPED | OUTPATIENT
Start: 2021-01-13 | End: 2021-01-20

## 2021-01-13 RX ORDER — SULFAMETHOXAZOLE AND TRIMETHOPRIM 800; 160 MG/1; MG/1
1 TABLET ORAL 2 TIMES DAILY
Qty: 20 TAB | Refills: 0 | Status: SHIPPED | OUTPATIENT
Start: 2021-01-13 | End: 2021-01-19 | Stop reason: SDUPTHER

## 2021-01-13 RX ORDER — FENOFIBRATE 48 MG/1
48 TABLET, COATED ORAL DAILY
Qty: 90 TAB | Refills: 3 | Status: SHIPPED | OUTPATIENT
Start: 2021-01-13 | End: 2021-10-29

## 2021-01-13 RX ORDER — PEN NEEDLE, DIABETIC 30 GX3/16"
NEEDLE, DISPOSABLE MISCELLANEOUS
Qty: 1 PACKAGE | Refills: 11 | Status: SHIPPED | OUTPATIENT
Start: 2021-01-13

## 2021-01-13 RX ORDER — AMOXICILLIN AND CLAVULANATE POTASSIUM 875; 125 MG/1; MG/1
1 TABLET, FILM COATED ORAL EVERY 12 HOURS
Qty: 20 TAB | Refills: 0 | Status: SHIPPED | OUTPATIENT
Start: 2021-01-13 | End: 2021-01-19 | Stop reason: SDUPTHER

## 2021-01-13 NOTE — PROGRESS NOTES
1. Have you been to the ER, urgent care clinic since your last visit? Hospitalized since your last visit? No    2. Have you seen or consulted any other health care providers outside of the [address] [place] [address] since your last visit? Include any pap smears or colon screening.  No    Health Maintenance Due   Topic Date Due    Hepatitis C Screening  1960    Pneumococcal 0-64 years (1 of 1 - PPSV23) 07/25/1968    Foot Exam Q1  07/25/1972    Eye Exam Retinal or Dilated  07/25/1972    DTaP/Tdap/Td series (1 - Tdap) 07/25/1983    Shingrix Vaccine Age 50> (1 of 2) 07/25/2012    MICROALBUMIN Q1  06/04/2020    Medicare Yearly Exam  07/08/2020     Chief Complaint   Patient presents with    Follow-up     toe on (L) foot

## 2021-01-13 NOTE — PROGRESS NOTES
PEDRO LUIS  Marek Lamar is a 64 y.o. male who presents to follow-up on diabetic foot ulcer and infection. He saw podiatrist last week who felt that this was unlikely to be osteomyelitis at this point based on x-ray. Patient has been taking antibiotic and notices that it is still red although it looks less erythematous to me. More of a pink, about as swollen. The ulcer is obviously still present. There is no drainage    Labs reviewed with patient. Triglycerides greater than 1000 and A1c is 10. He is taking Metformin and forgetting it about 3 days out of the week. 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 20 Tab 0    amoxicillin-clavulanate (AUGMENTIN) 875-125 mg per tablet Take 1 Tab by mouth every twelve (12) hours for 10 days. 20 Tab 0    insulin glargine (LANTUS,BASAGLAR) 100 unit/mL (3 mL) inpn 30 Units by SubCUTAneous route nightly. 27 mL 3    fenofibrate nanocrystallized (TRICOR) 48 mg tablet Take 1 Tab by mouth daily. 90 Tab 3    Insulin Needles, Disposable, 31 gauge x 5/16\" ndle Inject Lantus daily 1 Package 11    gabapentin (NEURONTIN) 100 mg capsule TAKE 2 CAPSULES BY MOUTH EVERY NIGHT AS NEEDED FOR NERVE PAIN. MAX DAILY AMOUNT: 200  Cap 1    metFORMIN (GLUCOPHAGE) 1,000 mg tablet TAKE 1 TABLET BY MOUTH TWICE A DAY WITH MEALS 180 Tab 3    glipiZIDE (GLUCOTROL) 5 mg tablet Take 1 Tab by mouth two (2) times a day. 180 Tab 3    Insulin Needles, Disposable, 32 gauge x 5/32\" ndle by Does Not Apply route.       triamcinolone acetonide (KENALOG) 0.1 % topical cream Apply  to affected area two (2) times a day. use thin layer 80 g 3    glucose blood VI test strips (BLOOD GLUCOSE TEST) strip Use to check blood sugar 3 times daily 200 Strip 11    lancets misc Test 3 times daily. 200 Each 11       Allergies:   No Known Allergies    Smoker:  Social History     Tobacco Use   Smoking Status Former Smoker    Quit date: 2019    Years since quittin.9   Smokeless Tobacco Never Used       ETOH:   Social History     Substance and Sexual Activity   Alcohol Use No    Frequency: Never       FH:   Family History   Problem Relation Age of Onset    Pneumonia Mother     Heart Disease Father        ROS:   As listed in HPI. In addition:  Constitutional:   No headache, fever, fatigue, weight loss or weight gain      Cardiac:    No chest pain      Resp:   No cough or shortness of breath      Neuro   No loss of consciousness, dizziness, seizures      Physical Exam:  Blood pressure 131/78, pulse 80, temperature 97.1 °F (36.2 °C), temperature source Temporal, resp. rate 17, height 5' 11\" (1.803 m), weight 248 lb (112.5 kg), SpO2 95 %. GEN: No apparent distress. Alert and oriented and responds to all questions appropriately. NEUROLOGIC:  No focal neurologic deficits. Strength and sensation grossly intact. Coordination and gait grossly intact. EXT: Well perfused. No edema. SKIN: No obvious rashes. Foot, see HPI       Assessment and Plan     Diabetes  A1c 10.1 up from 7.5%  Try to take your Metformin every day  Restart Lantus 30 units (0.3 units/kg). Note that he was previously taking 20 units but that was coupled with a dramatic improvement in diet and he weighed less at the time    Hypertriglyceridemia  Fenofibrate    Check your blood sugar daily for the next week and follow-up with numbers in 1 week    Diabetic foot infection  Please follow with podiatry. MRI does not appear to be necessary at the moment.   Does look better but not obvious this infection is run its course will extend the antibiotic      ICD-10-CM ICD-9-CM    1. Type 2 diabetes mellitus without complication, without long-term current use of insulin (HCC)  E11.9 250.00 insulin glargine (LANTUS,BASAGLAR) 100 unit/mL (3 mL) inpn      Insulin Needles, Disposable, 31 gauge x 5/16\" ndle   2. Diabetic foot infection (HCC)  E11.628 250.80 trimethoprim-sulfamethoxazole (BACTRIM DS, SEPTRA DS) 160-800 mg per tablet    L08.9 686.9 amoxicillin-clavulanate (AUGMENTIN) 875-125 mg per tablet   3. Hypertriglyceridemia  E78.1 272.1 fenofibrate nanocrystallized (TRICOR) 48 mg tablet   4. Type 2 diabetes mellitus with complication (HCC)  N66.2 250.90        AVS given.  Pt expressed understanding of instructions

## 2021-01-19 ENCOUNTER — TELEPHONE (OUTPATIENT)
Dept: FAMILY MEDICINE CLINIC | Age: 61
End: 2021-01-19

## 2021-01-19 DIAGNOSIS — E11.628 DIABETIC FOOT INFECTION (HCC): ICD-10-CM

## 2021-01-19 DIAGNOSIS — L08.9 DIABETIC FOOT INFECTION (HCC): ICD-10-CM

## 2021-01-19 RX ORDER — SULFAMETHOXAZOLE AND TRIMETHOPRIM 800; 160 MG/1; MG/1
1 TABLET ORAL 2 TIMES DAILY
Qty: 20 TAB | Refills: 0 | Status: SHIPPED | OUTPATIENT
Start: 2021-01-19 | End: 2021-01-29

## 2021-01-19 RX ORDER — AMOXICILLIN AND CLAVULANATE POTASSIUM 875; 125 MG/1; MG/1
1 TABLET, FILM COATED ORAL EVERY 12 HOURS
Qty: 20 TAB | Refills: 0 | Status: SHIPPED | OUTPATIENT
Start: 2021-01-19 | End: 2021-01-29

## 2021-01-19 NOTE — TELEPHONE ENCOUNTER
Message from [facility]    /telephone  Received: Today  Message Contents   Carlota Judge Inside Secure Office Pool   Phone Number:  [phone] (Call me)             General Message/Vendor Calls     Caller's first and last name: N/A       Reason for call: He thought another abx was going to be called in for his toe but the pharmacy does not have anything.        Callback required yes/no and why: Yes       Best contact number(s): [phone]       Details to clarify the request:N/A       Colonel Leyden

## 2021-01-19 NOTE — TELEPHONE ENCOUNTER
Plan was to extend the course of his current antibiotic. I had sent that in a week ago. Maybe they re shelved it.   I resent it today

## 2021-01-20 ENCOUNTER — OFFICE VISIT (OUTPATIENT)
Dept: FAMILY MEDICINE CLINIC | Age: 61
End: 2021-01-20
Payer: MEDICARE

## 2021-01-20 VITALS
TEMPERATURE: 97.1 F | WEIGHT: 246 LBS | OXYGEN SATURATION: 94 % | RESPIRATION RATE: 17 BRPM | BODY MASS INDEX: 34.44 KG/M2 | SYSTOLIC BLOOD PRESSURE: 146 MMHG | HEART RATE: 87 BPM | HEIGHT: 71 IN | DIASTOLIC BLOOD PRESSURE: 80 MMHG

## 2021-01-20 DIAGNOSIS — L08.9 DIABETIC FOOT INFECTION (HCC): ICD-10-CM

## 2021-01-20 DIAGNOSIS — E11.628 DIABETIC FOOT INFECTION (HCC): ICD-10-CM

## 2021-01-20 DIAGNOSIS — E11.9 TYPE 2 DIABETES MELLITUS WITHOUT COMPLICATION, WITHOUT LONG-TERM CURRENT USE OF INSULIN (HCC): Primary | ICD-10-CM

## 2021-01-20 DIAGNOSIS — E78.1 HYPERTRIGLYCERIDEMIA: ICD-10-CM

## 2021-01-20 PROCEDURE — G0463 HOSPITAL OUTPT CLINIC VISIT: HCPCS | Performed by: FAMILY MEDICINE

## 2021-01-20 PROCEDURE — 2022F DILAT RTA XM EVC RTNOPTHY: CPT | Performed by: FAMILY MEDICINE

## 2021-01-20 PROCEDURE — 3046F HEMOGLOBIN A1C LEVEL >9.0%: CPT | Performed by: FAMILY MEDICINE

## 2021-01-20 PROCEDURE — G9711 PT HX TOT COL OR COLON CA: HCPCS | Performed by: FAMILY MEDICINE

## 2021-01-20 PROCEDURE — 99214 OFFICE O/P EST MOD 30 MIN: CPT | Performed by: FAMILY MEDICINE

## 2021-01-20 PROCEDURE — G8510 SCR DEP NEG, NO PLAN REQD: HCPCS | Performed by: FAMILY MEDICINE

## 2021-01-20 PROCEDURE — G8427 DOCREV CUR MEDS BY ELIG CLIN: HCPCS | Performed by: FAMILY MEDICINE

## 2021-01-20 PROCEDURE — G8417 CALC BMI ABV UP PARAM F/U: HCPCS | Performed by: FAMILY MEDICINE

## 2021-01-20 NOTE — PROGRESS NOTES
PEDRO LUIS Acosta is a 64 y.o. male who presents to follow-up on diabetic foot ulcer and infection. He saw podiatrist first week of January who felt that this was unlikely to be osteomyelitis at this point based on x-ray. Patient has been taking antibiotic and notices that it is still red although it looks less erythematous to me. More of a pink, swelling is reduced. The ulcer is obviously still present. There is no drainage    Labs reviewed with patient. Triglycerides greater than 1000 and A1c is 10. He is taking Metformin and forgetting it about 3 days out of the week. 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 9 mL 3    trimethoprim-sulfamethoxazole (BACTRIM DS, SEPTRA DS) 160-800 mg per tablet Take 1 Tab by mouth two (2) times a day for 10 days. 20 Tab 0    amoxicillin-clavulanate (AUGMENTIN) 875-125 mg per tablet Take 1 Tab by mouth every twelve (12) hours for 10 days. 20 Tab 0    fenofibrate nanocrystallized (TRICOR) 48 mg tablet Take 1 Tab by mouth daily. 90 Tab 3    Insulin Needles, Disposable, 31 gauge x 5/16\" ndle Inject Lantus daily 1 Package 11    gabapentin (NEURONTIN) 100 mg capsule TAKE 2 CAPSULES BY MOUTH EVERY NIGHT AS NEEDED FOR NERVE PAIN. MAX DAILY AMOUNT: 200  Cap 1    metFORMIN (GLUCOPHAGE) 1,000 mg tablet TAKE 1 TABLET BY MOUTH TWICE A DAY WITH MEALS 180 Tab 3    glipiZIDE (GLUCOTROL) 5 mg tablet Take 1 Tab by mouth two (2) times a day. 180 Tab 3    Insulin Needles, Disposable, 32 gauge x 5/32\" ndle by Does Not Apply route.       triamcinolone acetonide (KENALOG) 0.1 % topical cream Apply  to affected area two (2) times a day. use thin layer 80 g 3    glucose blood VI test strips (BLOOD GLUCOSE TEST) strip Use to check blood sugar 3 times daily 200 Strip 11    lancets misc Test 3 times daily. 200 Each 11       Allergies:   No Known Allergies    Smoker:  Social History     Tobacco Use   Smoking Status Former Smoker    Quit date: 2019    Years since quittin.9   Smokeless Tobacco Never Used       ETOH:   Social History     Substance and Sexual Activity   Alcohol Use No    Frequency: Never       FH:   Family History   Problem Relation Age of Onset    Pneumonia Mother     Heart Disease Father        ROS:   As listed in HPI. In addition:  Constitutional:   No headache, fever, fatigue, weight loss or weight gain      Cardiac:    No chest pain      Resp:   No cough or shortness of breath      Neuro   No loss of consciousness, dizziness, seizures      Physical Exam:  Blood pressure (!) 146/80, pulse 87, temperature 97.1 °F (36.2 °C), temperature source Temporal, resp. rate 17, height 5' 11\" (1.803 m), weight 246 lb (111.6 kg), SpO2 94 %. GEN: No apparent distress. Alert and oriented and responds to all questions appropriately. NEUROLOGIC:  No focal neurologic deficits. Strength and sensation grossly intact. Coordination and gait grossly intact. EXT: Well perfused. No edema. SKIN: No obvious rashes. Foot, see HPI       Assessment and Plan     Diabetes  A1c 10.1 up from 7.5%  Try to take your Metformin every day    Did to restart his Lantus insulin but he was told co-pay would be $400  We will send generic insulin to [facility]    15 units twice daily NPH, (0.3 units/kg)    Check your sugar fasting daily write numbers down and follow-up in 1 week for dose adjustment      Hypertriglyceridemia  Fenofibrate      Diabetic foot infection  Please follow with podiatry. MRI does not appear to be necessary at the moment.   Does look better but not obvious this infection is run its course will extend the antibiotic      ICD-10-CM ICD-9-CM    1. Type 2 diabetes mellitus without complication, without long-term current use of insulin ([facility])  E11.9 250.00 insulin NPH (NOVOLIN N, HUMULIN N) 100 unit/mL injection   2. Diabetic foot infection ([facility]  E11.628 250.80     L08.9 686.9    3. Hypertriglyceridemia  E78.1 272.1        AVS given.  Pt expressed understanding of instructions

## 2021-01-20 NOTE — PROGRESS NOTES
1. Have you been to the ER, urgent care clinic since your last visit? Hospitalized since your last visit? No    2. Have you seen or consulted any other health care providers outside of the [address] [place] [address] since your last visit? Include any pap smears or colon screening.  No    Health Maintenance Due   Topic Date Due    Hepatitis C Screening  1960    Pneumococcal 0-64 years (1 of 1 - PPSV23) 07/25/1968    Foot Exam Q1  07/25/1972    Eye Exam Retinal or Dilated  07/25/1972    COVID-19 Vaccine (1 of 2) 07/25/1978    DTaP/Tdap/Td series (1 - Tdap) 07/25/1983    Shingrix Vaccine Age 50> (1 of 2) 07/25/2012    MICROALBUMIN Q1  06/04/2020    Medicare Yearly Exam  07/08/2020     Chief Complaint   Patient presents with    Follow-up

## 2021-01-21 ENCOUNTER — TELEPHONE (OUTPATIENT)
Dept: FAMILY MEDICINE CLINIC | Age: 61
End: 2021-01-21

## 2021-01-21 NOTE — TELEPHONE ENCOUNTER
----- Message from Dalila Garcia sent at 1/20/2021  4:50 PM EST -----  Regarding: Dr. Lemus Salvage: [phone]  General Message/Vendor Calls    Caller's first and last name:Pt      Reason for call: Pharmacy told pt his Insulin Rx would be a 20 minute wait, then told the pt they did not have a record for it on file. Callback required yes/no and why: Yes      Best contact number(s):[phone]      Details to clarify the request: Chart indicates the pharmacy confirms receipt of an order for Insulin today at 12:08. Please advise.       Dalila Garcia

## 2021-01-21 NOTE — TELEPHONE ENCOUNTER
Patient would like to speak to the nurse about meds. He says that [facility] in [place] still has no record of insulin script.  Please call patient when this has been called in at [phone]

## 2021-01-22 NOTE — TELEPHONE ENCOUNTER
verified. Pt recv'd call from [facility] that Rx is available for p/u in 20 minutes.

## 2021-05-14 DIAGNOSIS — E11.9 TYPE 2 DIABETES MELLITUS WITHOUT COMPLICATION, WITHOUT LONG-TERM CURRENT USE OF INSULIN (HCC): ICD-10-CM

## 2021-05-14 RX ORDER — GLIPIZIDE 5 MG/1
TABLET ORAL
Qty: 180 TAB | Refills: 3 | Status: SHIPPED | OUTPATIENT
Start: 2021-05-14 | End: 2022-08-19

## 2021-08-26 RX ORDER — METFORMIN HYDROCHLORIDE 1000 MG/1
TABLET ORAL
Qty: 180 TABLET | Refills: 0 | Status: SHIPPED | OUTPATIENT
Start: 2021-08-26

## 2021-10-29 ENCOUNTER — OFFICE VISIT (OUTPATIENT)
Dept: FAMILY MEDICINE CLINIC | Age: 61
End: 2021-10-29
Payer: MEDICARE

## 2021-10-29 VITALS
SYSTOLIC BLOOD PRESSURE: 147 MMHG | OXYGEN SATURATION: 94 % | BODY MASS INDEX: 33.74 KG/M2 | HEIGHT: 71 IN | DIASTOLIC BLOOD PRESSURE: 79 MMHG | HEART RATE: 89 BPM | WEIGHT: 241 LBS | RESPIRATION RATE: 20 BRPM | TEMPERATURE: 98.2 F

## 2021-10-29 DIAGNOSIS — E78.1 HYPERTRIGLYCERIDEMIA: ICD-10-CM

## 2021-10-29 DIAGNOSIS — E11.9 TYPE 2 DIABETES MELLITUS WITHOUT COMPLICATION, WITHOUT LONG-TERM CURRENT USE OF INSULIN (HCC): Primary | ICD-10-CM

## 2021-10-29 DIAGNOSIS — R03.0 ELEVATED BP WITHOUT DIAGNOSIS OF HYPERTENSION: ICD-10-CM

## 2021-10-29 LAB
ALBUMIN SERPL-MCNC: 3.7 G/DL (ref 3.5–5)
ALBUMIN/GLOB SERPL: 0.9 {RATIO} (ref 1.1–2.2)
ALP SERPL-CCNC: 166 U/L (ref 45–117)
ALT SERPL-CCNC: 53 U/L (ref 12–78)
ANION GAP SERPL CALC-SCNC: 6 MMOL/L (ref 5–15)
AST SERPL-CCNC: 22 U/L (ref 15–37)
BASOPHILS # BLD: 0.1 K/UL (ref 0–0.1)
BASOPHILS NFR BLD: 1 % (ref 0–1)
BILIRUB SERPL-MCNC: 0.5 MG/DL (ref 0.2–1)
BUN SERPL-MCNC: 17 MG/DL (ref 6–20)
BUN/CREAT SERPL: 18 (ref 12–20)
CALCIUM SERPL-MCNC: <10 MG/DL (ref 8.5–10.1)
CHLORIDE SERPL-SCNC: 100 MMOL/L (ref 97–108)
CHOLEST SERPL-MCNC: 237 MG/DL
CO2 SERPL-SCNC: 26 MMOL/L (ref 21–32)
CREAT SERPL-MCNC: 0.93 MG/DL (ref 0.7–1.3)
DIFFERENTIAL METHOD BLD: ABNORMAL
EOSINOPHIL # BLD: 0.2 K/UL (ref 0–0.4)
EOSINOPHIL NFR BLD: 3 % (ref 0–7)
ERYTHROCYTE [DISTWIDTH] IN BLOOD BY AUTOMATED COUNT: 12.9 % (ref 11.5–14.5)
GLOBULIN SER CALC-MCNC: 3.9 G/DL (ref 2–4)
GLUCOSE SERPL-MCNC: 357 MG/DL (ref 65–100)
HBA1C MFR BLD HPLC: 10.6 %
HCT VFR BLD AUTO: 47.3 % (ref 36.6–50.3)
HDLC SERPL-MCNC: 25 MG/DL
HDLC SERPL: 9.5 {RATIO} (ref 0–5)
HGB BLD-MCNC: 16.3 G/DL (ref 12.1–17)
IMM GRANULOCYTES # BLD AUTO: 0.1 K/UL (ref 0–0.04)
IMM GRANULOCYTES NFR BLD AUTO: 1 % (ref 0–0.5)
LDLC SERPL CALC-MCNC: ABNORMAL MG/DL (ref 0–100)
LDLC SERPL DIRECT ASSAY-MCNC: 80 MG/DL (ref 0–100)
LYMPHOCYTES # BLD: 2.8 K/UL (ref 0.8–3.5)
LYMPHOCYTES NFR BLD: 32 % (ref 12–49)
MCH RBC QN AUTO: 32.6 PG (ref 26–34)
MCHC RBC AUTO-ENTMCNC: 34.5 G/DL (ref 30–36.5)
MCV RBC AUTO: 94.6 FL (ref 80–99)
MONOCYTES # BLD: 0.8 K/UL (ref 0–1)
MONOCYTES NFR BLD: 9 % (ref 5–13)
NEUTS SEG # BLD: 4.9 K/UL (ref 1.8–8)
NEUTS SEG NFR BLD: 54 % (ref 32–75)
NRBC # BLD: 0 K/UL (ref 0–0.01)
NRBC BLD-RTO: 0 PER 100 WBC
PLATELET # BLD AUTO: 241 K/UL (ref 150–400)
PMV BLD AUTO: 11.5 FL (ref 8.9–12.9)
POTASSIUM SERPL-SCNC: 4.7 MMOL/L (ref 3.5–5.1)
PROT SERPL-MCNC: 7.6 G/DL (ref 6.4–8.2)
RBC # BLD AUTO: 5 M/UL (ref 4.1–5.7)
SODIUM SERPL-SCNC: 132 MMOL/L (ref 136–145)
TRIGL SERPL-MCNC: 2004 MG/DL (ref ?–150)
VLDLC SERPL CALC-MCNC: ABNORMAL MG/DL
WBC # BLD AUTO: 8.8 K/UL (ref 4.1–11.1)

## 2021-10-29 PROCEDURE — 3046F HEMOGLOBIN A1C LEVEL >9.0%: CPT | Performed by: FAMILY MEDICINE

## 2021-10-29 PROCEDURE — G9711 PT HX TOT COL OR COLON CA: HCPCS | Performed by: FAMILY MEDICINE

## 2021-10-29 PROCEDURE — 83036 HEMOGLOBIN GLYCOSYLATED A1C: CPT | Performed by: FAMILY MEDICINE

## 2021-10-29 PROCEDURE — 2022F DILAT RTA XM EVC RTNOPTHY: CPT | Performed by: FAMILY MEDICINE

## 2021-10-29 PROCEDURE — 99214 OFFICE O/P EST MOD 30 MIN: CPT | Performed by: FAMILY MEDICINE

## 2021-10-29 PROCEDURE — G0463 HOSPITAL OUTPT CLINIC VISIT: HCPCS | Performed by: FAMILY MEDICINE

## 2021-10-29 PROCEDURE — G8427 DOCREV CUR MEDS BY ELIG CLIN: HCPCS | Performed by: FAMILY MEDICINE

## 2021-10-29 PROCEDURE — G8417 CALC BMI ABV UP PARAM F/U: HCPCS | Performed by: FAMILY MEDICINE

## 2021-10-29 PROCEDURE — G8510 SCR DEP NEG, NO PLAN REQD: HCPCS | Performed by: FAMILY MEDICINE

## 2021-10-29 RX ORDER — INSULIN GLARGINE 100 [IU]/ML
30 INJECTION, SOLUTION SUBCUTANEOUS
Qty: 27 ML | Refills: 3 | Status: SHIPPED | OUTPATIENT
Start: 2021-10-29 | End: 2021-11-12

## 2021-10-29 RX ORDER — PEN NEEDLE, DIABETIC 30 GX3/16"
NEEDLE, DISPOSABLE MISCELLANEOUS
Qty: 1 EACH | Refills: 11 | Status: SHIPPED | OUTPATIENT
Start: 2021-10-29

## 2021-10-29 NOTE — PROGRESS NOTES
Room:   1  Identified pt with two pt identifiers(name and ). Reviewed record in preparation for visit and have obtained necessary documentation. All patient medications has been reviewed. Chief Complaint   Patient presents with    Diabetes    Follow-up       Health Maintenance Due   Topic    Hepatitis C Screening     Foot Exam Q1     Eye Exam Retinal or Dilated     COVID-19 Vaccine (1)    DTaP/Tdap/Td series (1 - Tdap)    Shingrix Vaccine Age 50> (1 of 2)    MICROALBUMIN Q1     Medicare Yearly Exam     A1C test (Diabetic or Prediabetic)     Flu Vaccine (1)       Vitals:    10/29/21 0833   BP: (!) 156/87   Pulse: 89   Resp: 20   Temp: 98.2 °F (36.8 °C)   TempSrc: Oral   SpO2: 94%   Weight: 241 lb (109.3 kg)   Height: 5' 11\" (1.803 m)   PainSc:   0 - No pain       4. Have you been to the ER, urgent care clinic since your last visit? Hospitalized since your last visit? No    5. Have you seen or consulted any other health care providers outside of the [address] [place] [address] since your last visit? Include any pap smears or colon screening. No    Patient is accompanied by self I have received verbal consent from Beverly Goodell to discuss any/all medical information while they are present in the room.

## 2021-10-29 NOTE — PROGRESS NOTES
HPI  Beverly Goodell is a 63 y.o. male who presents to follow-up on diabetes. Is A1c was 10 when I last saw him in January. Previously been on insulin and agreed to try insulin again but got frustrated with pharmacy and never started taking insulin so he has only been taking Metformin for the last 10 months. He also had a diabetic foot infection when I saw him last.  Is been following up with podiatry. Had a surgical procedure to remove the tip of the second toe on the left and has had a setback together and appears to have healed well    PMHx:  Past Medical History:   Diagnosis Date    DM (diabetes mellitus) ([facility]     oral agent    Ex-smoker 2017       Meds:   Current Outpatient Medications   Medication Sig Dispense Refill    insulin glargine (LANTUS,BASAGLAR) 100 unit/mL (3 mL) inpn 30 Units by SubCUTAneous route nightly. 27 mL 3    Insulin Needles, Disposable, 31 gauge x 5/16\" ndle Use to inject insulin daily 1 Each 11    metFORMIN (GLUCOPHAGE) 1,000 mg tablet TAKE 1 TABLET BY MOUTH TWICE A DAY WITH MEALS 180 Tablet 0    glipiZIDE (GLUCOTROL) 5 mg tablet TAKE 1 TABLET BY MOUTH TWICE DAILY 180 Tab 3    insulin NPH (NOVOLIN N, HUMULIN N) 100 unit/mL injection 15 Units by SubCUTAneous route Before breakfast and dinner. 9 mL 3    Insulin Needles, Disposable, 31 gauge x 5/16\" ndle Inject Lantus daily 1 Package 11    gabapentin (NEURONTIN) 100 mg capsule TAKE 2 CAPSULES BY MOUTH EVERY NIGHT AS NEEDED FOR NERVE PAIN. MAX DAILY AMOUNT: 200  Cap 1    Insulin Needles, Disposable, 32 gauge x 5/32\" ndle by Does Not Apply route.  triamcinolone acetonide (KENALOG) 0.1 % topical cream Apply  to affected area two (2) times a day. use thin layer 80 g 3    glucose blood VI test strips (BLOOD GLUCOSE TEST) strip Use to check blood sugar 3 times daily 200 Strip 11    lancets misc Test 3 times daily. 200 Each 11    fenofibrate nanocrystallized (TRICOR) 48 mg tablet Take 1 Tab by mouth daily.  (Patient not taking: Reported on 10/29/2021) 90 Tab 3       Allergies: Allergies   Allergen Reactions    Fenofibrate Rash     Rash located on the side and underneath the arm       Smoker:  Social History     Tobacco Use   Smoking Status Former Smoker    Quit date: 2019    Years since quittin.6   Smokeless Tobacco Never Used       ETOH:   Social History     Substance and Sexual Activity   Alcohol Use No       FH:   Family History   Problem Relation Age of Onset    Pneumonia Mother     Heart Disease Father        ROS:   As listed in HPI. In addition:  Constitutional:   No headache, fever, fatigue, weight loss or weight gain      Cardiac:    No chest pain      Resp:   No cough or shortness of breath      Neuro   No loss of consciousness, dizziness, seizures      Physical Exam:  Blood pressure (!) 147/79, pulse 89, temperature 98.2 °F (36.8 °C), temperature source Oral, resp. rate 20, height 5' 11\" (1.803 m), weight 241 lb (109.3 kg), SpO2 94 %. GEN: No apparent distress. Alert and oriented and responds to all questions appropriately. NEUROLOGIC:  No focal neurologic deficits. Strength and sensation grossly intact. Coordination and gait grossly intact. EXT: Well perfused. No edema. SKIN: No obvious rashes. Lungs clear to auscultation bilaterally  CV regular rhythm no murmur       Assessment and Plan     Diabetes  A1c 10.6 stable from 10.1 up from 7.5  Metformin 1000 mg twice daily  Add Lantus 30 units daily (0.3 units/kg)    Patient prefers pens  In the past brand-name insulins have been too expensive for him so we have had to go with the generic [facility] brand.   He will let us know if this is necessary    If we switch to generic he would prefer the NovoLog pens    Check your blood sugar first thing the morning fasting and write the numbers down  Follow-up in 1 week with fasting blood sugar log for dose adjustment    Hypertriglyceridemia  Had a rash to fenofibrate so he stopped it and the rash got better    High blood pressure  Unusual for him, he reports that he is under a lot of stress recently. Monitor, hopefully will be seeing him frequently for the next few weeks      ICD-10-CM ICD-9-CM    1. Type 2 diabetes mellitus without complication, without long-term current use of insulin ([facility])  E11.9 250.00 AMB POC HEMOGLOBIN A1C      insulin glargine (LANTUS,BASAGLAR) 100 unit/mL (3 mL) inpn      Insulin Needles, Disposable, 31 gauge x 5/16\" ndle       AVS given.  Pt expressed understanding of instructions

## 2021-11-01 ENCOUNTER — TELEPHONE (OUTPATIENT)
Dept: FAMILY MEDICINE CLINIC | Age: 61
End: 2021-11-01

## 2021-11-01 RX ORDER — PRAVASTATIN SODIUM 40 MG/1
40 TABLET ORAL
Qty: 90 TABLET | Refills: 3 | Status: SHIPPED | OUTPATIENT
Start: 2021-11-01

## 2021-11-01 NOTE — TELEPHONE ENCOUNTER
Labs reviewed. Triglycerides are extremely high. It will be difficult to get your blood sugar under better control if triglycerides remain high. You had trouble with fenofibrate.       I suggest we try a much different medication called pravastatin    Pended to encounter if agreeable

## 2021-11-05 NOTE — TELEPHONE ENCOUNTER
Pt states Lantus was expensive. Informed him that Dr. Marcio Gee will send in inexpensive Rx. [employer]

## 2021-11-05 NOTE — TELEPHONE ENCOUNTER
Clarify please which drug cost $500?     I assume he is referring to Lantus insulin  If so, we can try generic Novolin from [facility]

## 2021-11-12 DIAGNOSIS — E11.9 TYPE 2 DIABETES MELLITUS WITHOUT COMPLICATION, WITHOUT LONG-TERM CURRENT USE OF INSULIN (HCC): ICD-10-CM

## 2021-11-12 RX ORDER — PEN NEEDLE, DIABETIC 30 GX3/16"
NEEDLE, DISPOSABLE MISCELLANEOUS
Qty: 100 EACH | Refills: 11 | Status: SHIPPED | OUTPATIENT
Start: 2021-11-12

## 2021-11-12 RX ORDER — INSULIN GLARGINE 100 [IU]/ML
30 INJECTION, SOLUTION SUBCUTANEOUS
Qty: 27 ML | Refills: 3 | Status: CANCELLED | OUTPATIENT
Start: 2021-11-12

## 2021-11-12 NOTE — TELEPHONE ENCOUNTER
Refill Request (patient called in)     Requested Prescriptions     Pending Prescriptions Disp Refills    insulin glargine (LANTUS,BASAGLAR) 100 unit/mL (3 mL) inpn 27 mL 3     Si Units by SubCUTAneous route nightly.

## 2021-11-12 NOTE — TELEPHONE ENCOUNTER
He is actually requesting a cheaper insulin.   Sent generic NPH to [facility]    Please make sure he knows this has been done

## 2022-03-19 PROBLEM — E11.9 TYPE 2 DIABETES MELLITUS WITHOUT COMPLICATION, WITHOUT LONG-TERM CURRENT USE OF INSULIN (HCC): Status: ACTIVE | Noted: 2019-12-17

## 2022-08-19 DIAGNOSIS — E11.9 TYPE 2 DIABETES MELLITUS WITHOUT COMPLICATION, WITHOUT LONG-TERM CURRENT USE OF INSULIN (HCC): ICD-10-CM

## 2022-08-19 RX ORDER — GLIPIZIDE 5 MG/1
TABLET ORAL
Qty: 180 TABLET | Refills: 0 | Status: SHIPPED | OUTPATIENT
Start: 2022-08-19

## 2022-11-28 ENCOUNTER — OFFICE VISIT (OUTPATIENT)
Dept: FAMILY MEDICINE CLINIC | Age: 62
End: 2022-11-28
Payer: MEDICARE

## 2022-11-28 VITALS
TEMPERATURE: 97.6 F | DIASTOLIC BLOOD PRESSURE: 79 MMHG | HEIGHT: 71 IN | OXYGEN SATURATION: 95 % | BODY MASS INDEX: 32.81 KG/M2 | WEIGHT: 234.4 LBS | SYSTOLIC BLOOD PRESSURE: 144 MMHG | HEART RATE: 84 BPM | RESPIRATION RATE: 17 BRPM

## 2022-11-28 DIAGNOSIS — I10 ESSENTIAL HYPERTENSION: ICD-10-CM

## 2022-11-28 DIAGNOSIS — Z23 ENCOUNTER FOR IMMUNIZATION: ICD-10-CM

## 2022-11-28 DIAGNOSIS — E11.9 TYPE 2 DIABETES MELLITUS WITHOUT COMPLICATION, WITHOUT LONG-TERM CURRENT USE OF INSULIN (HCC): ICD-10-CM

## 2022-11-28 DIAGNOSIS — Z00.00 ROUTINE GENERAL MEDICAL EXAMINATION AT A HEALTH CARE FACILITY: Primary | ICD-10-CM

## 2022-11-28 LAB
ALBUMIN UR QL STRIP: 30 MG/L
CREATININE, URINE POC: 50 MG/DL
HBA1C MFR BLD HPLC: 11.6 %
MICROALBUMIN/CREAT RATIO POC: ABNORMAL MG/G

## 2022-11-28 PROCEDURE — 3046F HEMOGLOBIN A1C LEVEL >9.0%: CPT | Performed by: FAMILY MEDICINE

## 2022-11-28 PROCEDURE — 82044 UR ALBUMIN SEMIQUANTITATIVE: CPT | Performed by: FAMILY MEDICINE

## 2022-11-28 PROCEDURE — 3074F SYST BP LT 130 MM HG: CPT | Performed by: FAMILY MEDICINE

## 2022-11-28 PROCEDURE — G9711 PT HX TOT COL OR COLON CA: HCPCS | Performed by: FAMILY MEDICINE

## 2022-11-28 PROCEDURE — G0439 PPPS, SUBSEQ VISIT: HCPCS | Performed by: FAMILY MEDICINE

## 2022-11-28 PROCEDURE — 3078F DIAST BP <80 MM HG: CPT | Performed by: FAMILY MEDICINE

## 2022-11-28 PROCEDURE — G8510 SCR DEP NEG, NO PLAN REQD: HCPCS | Performed by: FAMILY MEDICINE

## 2022-11-28 PROCEDURE — G8417 CALC BMI ABV UP PARAM F/U: HCPCS | Performed by: FAMILY MEDICINE

## 2022-11-28 PROCEDURE — 99214 OFFICE O/P EST MOD 30 MIN: CPT | Performed by: FAMILY MEDICINE

## 2022-11-28 PROCEDURE — 2022F DILAT RTA XM EVC RTNOPTHY: CPT | Performed by: FAMILY MEDICINE

## 2022-11-28 PROCEDURE — G8427 DOCREV CUR MEDS BY ELIG CLIN: HCPCS | Performed by: FAMILY MEDICINE

## 2022-11-28 PROCEDURE — G0463 HOSPITAL OUTPT CLINIC VISIT: HCPCS | Performed by: FAMILY MEDICINE

## 2022-11-28 PROCEDURE — 90686 IIV4 VACC NO PRSV 0.5 ML IM: CPT | Performed by: FAMILY MEDICINE

## 2022-11-28 PROCEDURE — 83036 HEMOGLOBIN GLYCOSYLATED A1C: CPT | Performed by: FAMILY MEDICINE

## 2022-11-28 RX ORDER — INSULIN GLARGINE 100 [IU]/ML
30 INJECTION, SOLUTION SUBCUTANEOUS
Qty: 27 ML | Refills: 3 | Status: SHIPPED | OUTPATIENT
Start: 2022-11-28

## 2022-11-28 RX ORDER — LISINOPRIL 10 MG/1
10 TABLET ORAL DAILY
Qty: 90 TABLET | Refills: 3 | Status: SHIPPED | OUTPATIENT
Start: 2022-11-28

## 2022-11-28 NOTE — PROGRESS NOTES
Medicare Annual Wellness Visit     I have reviewed the patient's medical history in detail and updated the computerized patient record. History   David Nixon is a 63 y.o. male who presents to follow-up on chronic medical issues. 1 year since seen. A1c has been elevated recently. We have offered insulin and he has been willing to take it but has been running and frustrations getting it at the pharmacy. At his last visit October 2021 prescribed generic NPH and there was some issue getting this at [facility]. Reports he tried to pick it up a few times without success and gave up. Received a notice in the mail that insulin prescriptions should be no more than $35.  He is hopeful that he will be able to get insulin dealing with his normal pharmacy in [facility]. Has a tight/cold feeling in his feet left worse than right. This does occasionally keep him up at night with a perception that he needs to rub his foot or wrap it up in order to keep it warm. He takes 1-2 gabapentin sparingly and does not feel like it is as effective as it used to be. Does not feel the need to take medication every day    Past Medical History:   Diagnosis Date    DM (diabetes mellitus) ([facility]     oral agent    Ex-smoker 2017      Past Surgical History:   Procedure Laterality Date    COLONOSCOPY N/A 7/8/2020    COLONOSCOPY performed by Mark Duckworth MD at [facility]      HX TOTAL COLECTOMY       Current Outpatient Medications   Medication Sig Dispense Refill    insulin glargine (LANTUS,BASAGLAR) 100 unit/mL (3 mL) inpn 30 Units by SubCUTAneous route nightly. 27 mL 3    lisinopriL (PRINIVIL, ZESTRIL) 10 mg tablet Take 1 Tablet by mouth daily. 90 Tablet 3    glipiZIDE (GLUCOTROL) 5 mg tablet TAKE 1 TABLET BY MOUTH TWICE DAILY 180 Tablet 0    pravastatin (PRAVACHOL) 40 mg tablet Take 1 Tablet by mouth nightly.  90 Tablet 3    Insulin Needles, Disposable, 31 gauge x 5/16\" ndle Use to inject insulin daily 1 Each 11    metFORMIN (GLUCOPHAGE) 1,000 mg tablet TAKE 1 TABLET BY MOUTH TWICE A DAY WITH MEALS 180 Tablet 0    gabapentin (NEURONTIN) 100 mg capsule TAKE 2 CAPSULES BY MOUTH EVERY NIGHT AS NEEDED FOR NERVE PAIN. MAX DAILY AMOUNT: 200  Cap 1    Insulin Needles, Disposable, 32 gauge x 5/32\" ndle by Does Not Apply route. triamcinolone acetonide (KENALOG) 0.1 % topical cream Apply  to affected area two (2) times a day. use thin layer 80 g 3    glucose blood VI test strips (BLOOD GLUCOSE TEST) strip Use to check blood sugar 3 times daily 200 Strip 11    lancets misc Test 3 times daily.  200 Each 11     Allergies   Allergen Reactions    Fenofibrate Rash     Rash located on the side and underneath the arm     Family History   Problem Relation Age of Onset    Pneumonia Mother     Heart Disease Father      Social History     Tobacco Use    Smoking status: Former     Types: Cigarettes     Quit date: 2/19/2019     Years since quitting: 3.7    Smokeless tobacco: Never   Substance Use Topics    Alcohol use: No     Patient Active Problem List   Diagnosis Code    Type 2 diabetes mellitus without complication, without long-term current use of insulin ([facility] E11.9       Depression Risk Factor Screening:     3 most recent PHQ Screens 11/28/2022   Little interest or pleasure in doing things Not at all   Feeling down, depressed, irritable, or hopeless Not at all   Total Score PHQ 2 0   Trouble falling or staying asleep, or sleeping too much -   Feeling tired or having little energy -   Poor appetite, weight loss, or overeating -   Feeling bad about yourself - or that you are a failure or have let yourself or your family down -   Trouble concentrating on things such as school, work, reading, or watching TV -   Moving or speaking so slowly that other people could have noticed; or the opposite being so fidgety that others notice -   Thoughts of being better off dead, or hurting yourself in some way -   PHQ 9 Score -   How difficult have these problems made it for you to do your work, take care of your home and get along with others -     Alcohol Risk Factor Screening: On any occasion during the past 3 months, have you had more than 4 drinks containing alcohol?  no    Do you average more than 14 drinks per week?  no      Functional Ability and Level of Safety:     Hearing Loss   none    Activities of Daily Living   Self-care. Requires assistance with: no ADLs    Fall Risk   No flowsheet data found. Abuse Screen   Patient is not abused    Review of Systems   ROS:  As listed in HPI. In addition:  Constitutional:   No headache, fever, fatigue, weight loss or weight gain      Eyes:   No redness, pruritis, pain, visual changes, swelling, or discharge      Ears:    No pain, loss or changes in hearing     Cardiac:    No chest pain      Resp:   No cough or shortness of breath      Neuro   No loss of consciousness, dizziness, seizure    Physical Examination     Evaluation of Cognitive Function:  Mood/affect:  happy  Appearance: age appropriate  Family member/caregiver input:     Physical Exam:  Blood pressure (!) 144/79, pulse 84, temperature 97.6 °F (36.4 °C), temperature source Temporal, resp. rate 17, height 5' 11\" (1.803 m), weight 234 lb 6.4 oz (106.3 kg), SpO2 95 %. GEN: No apparent distress. Alert and oriented and responds to all questions appropriately. EYES:  Conjunctiva clear; pupils round and reactive to light; extraocular movements are intact. EAR: Tympanic membrane on the right is obscured by thick impacted cerumen that does not appear to be affecting his hearing   NOSE: Turbinates are within normal limits. No drainage  OROPHYARYNX: No oral lesions or exudates. NECK:  Supple; no masses; thyroid normal           LUNGS: Respirations unlabored; clear to auscultation bilaterally  CARDIOVASCULAR: Regular, rate, and rhythm without murmurs   NEUROLOGIC:  No focal neurologic deficits. Strength and sensation grossly intact. Coordination and gait grossly intact. EXT: Well perfused. No edema. SKIN: No obvious rashes. Diabetic foot exam.  The left second toe tip has been amputated. There is a callus on the bottom of the middle toe on the left foot. Sensation is intact microfiber    Patient Care Team:  Jodee Ortiz MD as PCP - General (Family Medicine)  Jodee Ortiz MD as PCP - [address] [place] [address] Dr Melendez Provider    Advice/Referrals/Counseling   Education and counseling provided:    Recommend eye exam.    Recommended COVID shot (had a mild case of COVID in July)    Flu shot today    Assessment/Plan     Diabetes  A1c 11.6 up from 10.6 stable from 10.1 up from 7.5  Metformin 1000 mg twice daily  Glipizide 5 mg twice daily (likely will be stopping this as we get Lantus dialed in)  Add Lantus 30 units daily (0.3 units/kg)     Patient prefers pens      Check your blood sugar first thing the morning fasting and write the numbers down  Follow-up in 2 weeks with fasting blood sugar log for dose adjustment     Hypertriglyceridemia  Had a rash to fenofibrate so he stopped it and the rash got better   pravastatin is new since last labs    Htn  Did not come down on recheck  Add lisinopril 10 mg  Emphasized importance of good blood pressure control for protection against heart attack and stroke      Diabetic neuropathy  Uncomfortable but not described as a pain. Taking gabapentin intermittently with diminishing returns. Emphasized the importance of good blood sugar control    ICD-10-CM ICD-9-CM    1. Routine general medical examination at a health care facility  Z00.00 V70.0       2. Type 2 diabetes mellitus without complication, without long-term current use of insulin ([facility])  E11.9 250.00 AMB POC URINE, MICROALBUMIN, SEMIQUANT (3 RESULTS)      AMB POC HEMOGLOBIN A1C       DIABETES FOOT EXAM      insulin glargine (LANTUS,BASAGLAR) 100 unit/mL (3 mL) inpn      3.  Encounter for immunization  Z23 V03.89 SD IMMUNIZ ADMIN,1 SINGLE/COMB VAC/TOXOID INFLUENZA, FLUARIX, FLULAVAL, FLUZONE (AGE 6 MO+), AFLURIA(AGE 3Y+) IM, PF, 0.5 ML      4.  Essential hypertension  I10 401.9 lisinopriL (PRINIVIL, ZESTRIL) 10 mg tablet      LIPID PANEL      CBC WITH AUTOMATED DIFF      METABOLIC PANEL, COMPREHENSIVE      TSH 3RD GENERATION

## 2022-11-28 NOTE — PATIENT INSTRUCTIONS
Vaccine Information Statement    Influenza (Flu) Vaccine (Inactivated or Recombinant): What You Need to Know    Many vaccine information statements are available in [place] and other languages. See www.immunize.org/vis. Hojas de información sobre vacunas están disponibles en español y en muchos otros idiomas. Visite www.immunize.org/vis. 1. Why get vaccinated? Influenza vaccine can prevent influenza (flu). Flu is a contagious disease that spreads around the [facility] every year, usually between October and May. Anyone can get the flu, but it is more dangerous for some people. Infants and young children, people 72 years and older, pregnant people, and people with certain health conditions or a weakened immune system are at greatest risk of flu complications. Pneumonia, bronchitis, sinus infections, and ear infections are examples of flu-related complications. If you have a medical condition, such as heart disease, cancer, or diabetes, flu can make it worse. Flu can cause fever and chills, sore throat, muscle aches, fatigue, cough, headache, and runny or stuffy nose. Some people may have vomiting and diarrhea, though this is more common in children than adults. In an average year, thousands of people in the [facility] die from flu, and many more are hospitalized. Flu vaccine prevents millions of illnesses and flu-related visits to the doctor each year. 2. Influenza vaccines     CDC recommends everyone 6 months and older get vaccinated every flu season. Children 6 months through 6years of age may need 2 doses during a single flu season. Everyone else needs only 1 dose each flu season. It takes about 2 weeks for protection to develop after vaccination. There are many flu viruses, and they are always changing. Each year a new flu vaccine is made to protect against the influenza viruses believed to be likely to cause disease in the upcoming flu season.  Even when the vaccine doesnt exactly match these viruses, it may still provide some protection. Influenza vaccine does not cause flu. Influenza vaccine may be given at the same time as other vaccines. 3. Talk with your health care provider    Tell your vaccination provider if the person getting the vaccine:  Has had an allergic reaction after a previous dose of influenza vaccine, or has any severe, life-threatening allergies   Has ever had Guillain-Barré Syndrome (also called GBS)    In some cases, your health care provider may decide to postpone influenza vaccination until a future visit. Influenza vaccine can be administered at any time during pregnancy. People who are or will be pregnant during influenza season should receive inactivated influenza vaccine. People with minor illnesses, such as a cold, may be vaccinated. People who are moderately or severely ill should usually wait until they recover before getting influenza vaccine. Your health care provider can give you more information. 4. Risks of a vaccine reaction    Soreness, redness, and swelling where the shot is given, fever, muscle aches, and headache can happen after influenza vaccination. There may be a very small increased risk of Guillain-Barré Syndrome (GBS) after inactivated influenza vaccine (the flu shot). [place] children who get the flu shot along with pneumococcal vaccine (PCV13) and/or DTaP vaccine at the same time might be slightly more likely to have a seizure caused by fever. Tell your health care provider if a child who is getting flu vaccine has ever had a seizure. People sometimes faint after medical procedures, including vaccination. Tell your provider if you feel dizzy or have vision changes or ringing in the ears. As with any medicine, there is a very remote chance of a vaccine causing a severe allergic reaction, other serious injury, or death. 5. What if there is a serious problem?     An allergic reaction could occur after the vaccinated person leaves the clinic. If you see signs of a severe allergic reaction (hives, swelling of the face and throat, difficulty breathing, a fast heartbeat, dizziness, or weakness), call 9-1-1 and get the person to the nearest hospital.    For other signs that concern you, call your health care provider. Adverse reactions should be reported to the Vaccine Adverse Event Reporting System (VAERS). Your health care provider will usually file this report, or you can do it yourself. Visit the VAERS website at www.vaers. [facility].gov or call 0-706.277.6672. VAERS is only for reporting reactions, and VAERS staff members do not give medical advice. 6. The National Vaccine Injury Compensation Program    The [facility] Vaccine Injury Compensation Program (VICP) is a federal program that was created to compensate people who may have been injured by certain vaccines. Claims regarding alleged injury or death due to vaccination have a time limit for filing, which may be as short as two years. Visit the VICP website at www.[facility].gov/vaccinecompensation or call 0-202.986.9998 to learn about the program and about filing a claim. 7. How can I learn more? Ask your health care provider. Call your local or state health department. Visit the website of the Food and Drug Administration (FDA) for vaccine package inserts and additional information at www.fda.gov/vaccines-blood-biologics/vaccines. Contact the Centers for Disease Control and Prevention (CDC): Call 4-872.824.6366 (5-175-MWR-INFO) or  Visit CDCs influenza website at www.cdc.gov/flu. Vaccine Information Statement   Inactivated Influenza Vaccine   8/6/2021  [address] [place] [address] 402FI-[id number]   Department of Health and Human Services  Centers for Disease Control and Prevention    Office Use Only

## 2022-11-28 NOTE — PROGRESS NOTES
Health Maintenance Due   Topic Date Due    Hepatitis C Screening  Never done    Pneumococcal 0-64 years (1 - PCV) Never done    Foot Exam Q1  Never done    Eye Exam Retinal or Dilated  Never done    DTaP/Tdap/Td series (1 - Tdap) Never done    Shingrix Vaccine Age 50> (1 of 2) Never done    MICROALBUMIN Q1  06/04/2020    Medicare Yearly Exam  Never done    Flu Vaccine (1) 08/01/2022    A1C test (Diabetic or Prediabetic)  10/29/2022    Depression Screen  10/29/2022    Lipid Screen  10/29/2022     1. \"Have you been to the ER, urgent care clinic since your last visit? Hospitalized since your last visit? \" No    2. \"Have you seen or consulted any other health care providers outside of the [address] [place] [address] since your last visit? \" No     3. For patients aged 39-70: Has the patient had a colonoscopy / FIT/ Cologuard? Yes - Care Gap present. Most recent result on file      If the patient is female:    4. For patients aged 41-77: Has the patient had a mammogram within the past 2 years? NA - based on age or sex      11. For patients aged 21-65: Has the patient had a pap smear? NA - based on age or sex    Chay Treviño is a 63 y.o. male who presents for routine immunizations. He denies any symptoms , reactions or allergies that would exclude them from being immunized today. Risks and adverse reactions were discussed and the VIS was given to them. All questions were addressed. He was observed for 5 min post injection. There were no reactions observed.     Saw Carreon LPN

## 2022-11-29 LAB
ALBUMIN SERPL-MCNC: 3.8 G/DL (ref 3.5–5)
ALBUMIN/GLOB SERPL: 1 {RATIO} (ref 1.1–2.2)
ALP SERPL-CCNC: 104 U/L (ref 45–117)
ALT SERPL-CCNC: 29 U/L (ref 12–78)
ANION GAP SERPL CALC-SCNC: 7 MMOL/L (ref 5–15)
AST SERPL-CCNC: 12 U/L (ref 15–37)
BASOPHILS # BLD: 0.1 K/UL (ref 0–0.1)
BASOPHILS NFR BLD: 1 % (ref 0–1)
BILIRUB SERPL-MCNC: 0.4 MG/DL (ref 0.2–1)
BUN SERPL-MCNC: 17 MG/DL (ref 6–20)
BUN/CREAT SERPL: 17 (ref 12–20)
CALCIUM SERPL-MCNC: 9.4 MG/DL (ref 8.5–10.1)
CHLORIDE SERPL-SCNC: 102 MMOL/L (ref 97–108)
CHOLEST SERPL-MCNC: 226 MG/DL
CO2 SERPL-SCNC: 25 MMOL/L (ref 21–32)
CREAT SERPL-MCNC: 1.01 MG/DL (ref 0.7–1.3)
DIFFERENTIAL METHOD BLD: NORMAL
EOSINOPHIL # BLD: 0.2 K/UL (ref 0–0.4)
EOSINOPHIL NFR BLD: 2 % (ref 0–7)
ERYTHROCYTE [DISTWIDTH] IN BLOOD BY AUTOMATED COUNT: 12.1 % (ref 11.5–14.5)
GLOBULIN SER CALC-MCNC: 4 G/DL (ref 2–4)
GLUCOSE SERPL-MCNC: 325 MG/DL (ref 65–100)
HCT VFR BLD AUTO: 48.2 % (ref 36.6–50.3)
HDLC SERPL-MCNC: 37 MG/DL
HDLC SERPL: 6.1 {RATIO} (ref 0–5)
HGB BLD-MCNC: 16.1 G/DL (ref 12.1–17)
IMM GRANULOCYTES # BLD AUTO: 0 K/UL (ref 0–0.04)
IMM GRANULOCYTES NFR BLD AUTO: 0 % (ref 0–0.5)
LDLC SERPL CALC-MCNC: ABNORMAL MG/DL (ref 0–100)
LDLC SERPL DIRECT ASSAY-MCNC: 99 MG/DL (ref 0–100)
LYMPHOCYTES # BLD: 3.4 K/UL (ref 0.8–3.5)
LYMPHOCYTES NFR BLD: 37 % (ref 12–49)
MCH RBC QN AUTO: 30.3 PG (ref 26–34)
MCHC RBC AUTO-ENTMCNC: 33.4 G/DL (ref 30–36.5)
MCV RBC AUTO: 90.6 FL (ref 80–99)
MONOCYTES # BLD: 0.8 K/UL (ref 0–1)
MONOCYTES NFR BLD: 9 % (ref 5–13)
NEUTS SEG # BLD: 4.6 K/UL (ref 1.8–8)
NEUTS SEG NFR BLD: 51 % (ref 32–75)
NRBC # BLD: 0 K/UL (ref 0–0.01)
NRBC BLD-RTO: 0 PER 100 WBC
PLATELET # BLD AUTO: 233 K/UL (ref 150–400)
PMV BLD AUTO: 11.2 FL (ref 8.9–12.9)
POTASSIUM SERPL-SCNC: 4.4 MMOL/L (ref 3.5–5.1)
PROT SERPL-MCNC: 7.8 G/DL (ref 6.4–8.2)
RBC # BLD AUTO: 5.32 M/UL (ref 4.1–5.7)
SODIUM SERPL-SCNC: 134 MMOL/L (ref 136–145)
TRIGL SERPL-MCNC: 768 MG/DL (ref ?–150)
TSH SERPL DL<=0.05 MIU/L-ACNC: 1.3 UIU/ML (ref 0.36–3.74)
VLDLC SERPL CALC-MCNC: ABNORMAL MG/DL
WBC # BLD AUTO: 9 K/UL (ref 4.1–11.1)

## 2023-05-20 RX ORDER — INSULIN GLARGINE 100 [IU]/ML
30 INJECTION, SOLUTION SUBCUTANEOUS
COMMUNITY
Start: 2022-11-28

## 2023-05-20 RX ORDER — GABAPENTIN 100 MG/1
CAPSULE ORAL
COMMUNITY
Start: 2020-12-21

## 2023-05-20 RX ORDER — PRAVASTATIN SODIUM 40 MG
1 TABLET ORAL NIGHTLY
COMMUNITY
Start: 2021-11-01

## 2023-05-20 RX ORDER — GLIPIZIDE 5 MG/1
1 TABLET ORAL 2 TIMES DAILY
COMMUNITY
Start: 2022-08-19

## 2023-05-20 RX ORDER — LISINOPRIL 10 MG/1
10 TABLET ORAL DAILY
COMMUNITY
Start: 2022-11-28

## 2023-05-20 RX ORDER — TRIAMCINOLONE ACETONIDE 1 MG/G
CREAM TOPICAL 2 TIMES DAILY
COMMUNITY
Start: 2019-06-19

## 2023-05-20 RX ORDER — LANCETS 30 GAUGE
EACH MISCELLANEOUS
COMMUNITY
Start: 2019-04-12

## 2023-09-07 ENCOUNTER — TELEPHONE (OUTPATIENT)
Age: 63
End: 2023-09-07

## 2023-09-07 NOTE — TELEPHONE ENCOUNTER
Attempted to reach patient to schedule a follow-up appointment per monthly A1C patient outreach report.     Brent Lockwood LPN

## 2024-01-22 ENCOUNTER — OFFICE VISIT (OUTPATIENT)
Age: 64
End: 2024-01-22
Payer: MEDICARE

## 2024-01-22 VITALS
HEART RATE: 80 BPM | DIASTOLIC BLOOD PRESSURE: 80 MMHG | OXYGEN SATURATION: 97 % | RESPIRATION RATE: 17 BRPM | TEMPERATURE: 98.4 F | BODY MASS INDEX: 31.5 KG/M2 | HEIGHT: 71 IN | WEIGHT: 225 LBS | SYSTOLIC BLOOD PRESSURE: 137 MMHG

## 2024-01-22 DIAGNOSIS — L98.9 SKIN LESION: ICD-10-CM

## 2024-01-22 DIAGNOSIS — Z00.00 ROUTINE GENERAL MEDICAL EXAMINATION AT A HEALTH CARE FACILITY: Primary | ICD-10-CM

## 2024-01-22 DIAGNOSIS — E11.42 TYPE 2 DIABETES MELLITUS WITH DIABETIC POLYNEUROPATHY, WITHOUT LONG-TERM CURRENT USE OF INSULIN (HCC): ICD-10-CM

## 2024-01-22 DIAGNOSIS — E53.8 B12 DEFICIENCY: ICD-10-CM

## 2024-01-22 DIAGNOSIS — E55.9 VITAMIN D DEFICIENCY: ICD-10-CM

## 2024-01-22 DIAGNOSIS — I10 ESSENTIAL (PRIMARY) HYPERTENSION: ICD-10-CM

## 2024-01-22 PROCEDURE — 99214 OFFICE O/P EST MOD 30 MIN: CPT | Performed by: FAMILY MEDICINE

## 2024-01-22 PROCEDURE — G0439 PPPS, SUBSEQ VISIT: HCPCS | Performed by: FAMILY MEDICINE

## 2024-01-22 PROCEDURE — 3079F DIAST BP 80-89 MM HG: CPT | Performed by: FAMILY MEDICINE

## 2024-01-22 PROCEDURE — 3075F SYST BP GE 130 - 139MM HG: CPT | Performed by: FAMILY MEDICINE

## 2024-01-22 RX ORDER — PRAVASTATIN SODIUM 40 MG
40 TABLET ORAL NIGHTLY
Qty: 90 TABLET | Refills: 3 | Status: SHIPPED | OUTPATIENT
Start: 2024-01-22

## 2024-01-22 RX ORDER — LISINOPRIL 10 MG/1
10 TABLET ORAL DAILY
Qty: 90 TABLET | Refills: 3 | Status: SHIPPED | OUTPATIENT
Start: 2024-01-22

## 2024-01-22 RX ORDER — GABAPENTIN 100 MG/1
CAPSULE ORAL
Qty: 180 CAPSULE | Refills: 1 | Status: SHIPPED | OUTPATIENT
Start: 2024-01-22 | End: 2024-07-22

## 2024-01-22 RX ORDER — GLIPIZIDE 5 MG/1
5 TABLET ORAL 2 TIMES DAILY
Qty: 180 TABLET | Refills: 3 | Status: SHIPPED | OUTPATIENT
Start: 2024-01-22

## 2024-01-22 SDOH — ECONOMIC STABILITY: INCOME INSECURITY: HOW HARD IS IT FOR YOU TO PAY FOR THE VERY BASICS LIKE FOOD, HOUSING, MEDICAL CARE, AND HEATING?: NOT HARD AT ALL

## 2024-01-22 SDOH — ECONOMIC STABILITY: FOOD INSECURITY: WITHIN THE PAST 12 MONTHS, THE FOOD YOU BOUGHT JUST DIDN'T LAST AND YOU DIDN'T HAVE MONEY TO GET MORE.: NEVER TRUE

## 2024-01-22 SDOH — ECONOMIC STABILITY: FOOD INSECURITY: WITHIN THE PAST 12 MONTHS, YOU WORRIED THAT YOUR FOOD WOULD RUN OUT BEFORE YOU GOT MONEY TO BUY MORE.: NEVER TRUE

## 2024-01-22 SDOH — ECONOMIC STABILITY: HOUSING INSECURITY
IN THE LAST 12 MONTHS, WAS THERE A TIME WHEN YOU DID NOT HAVE A STEADY PLACE TO SLEEP OR SLEPT IN A SHELTER (INCLUDING NOW)?: NO

## 2024-01-22 ASSESSMENT — PATIENT HEALTH QUESTIONNAIRE - PHQ9
SUM OF ALL RESPONSES TO PHQ9 QUESTIONS 1 & 2: 1
SUM OF ALL RESPONSES TO PHQ QUESTIONS 1-9: 1
SUM OF ALL RESPONSES TO PHQ QUESTIONS 1-9: 1
1. LITTLE INTEREST OR PLEASURE IN DOING THINGS: 0
2. FEELING DOWN, DEPRESSED OR HOPELESS: 1
SUM OF ALL RESPONSES TO PHQ QUESTIONS 1-9: 1
SUM OF ALL RESPONSES TO PHQ QUESTIONS 1-9: 1

## 2024-01-22 NOTE — PROGRESS NOTES
Medicare Annual Wellness Visit     I have reviewed the patient's medical history in detail and updated the computerized patient record.     History   Moe eHarn is a 63 y.o. male who presents to follow-up on chronic medical issues.    Over 1 year since seen.    Stopped all of his medications because he was being aggravated by his pharmacy experiences.  Was taking multiple trips, finding that medications were not in stock when he needed them.  As far as he can recall when he was taking the medications he was not getting any side effects.    Has had poorly controlled diabetes.  Our last conversation was about starting insulin.  He ran into his pharmacy trouble while trying to  the insulin so he never actually gave this to try after our last appointment.    He has what appears to be diabetic neuropathy.  Previously described as a tight/cold feeling in his feet, currently described as an occasional shooting pain in his feet and a sense of heaviness even after he takes his shoes off he feels like he still has shoes on.  When he had gabapentin available to him he would take 1 or 2 gabapentin up to 3 sometimes.  Felt like it would knock him out before he got any actual pain relief    Past Medical History:   Diagnosis Date    DM (diabetes mellitus) (HCC)     oral agent    Ex-smoker 2017      Past Surgical History:   Procedure Laterality Date    COLONOSCOPY N/A 7/8/2020    COLONOSCOPY performed by Jose Alberto Barraza MD at [facility]    ORTHOPEDIC SURGERY      TOTAL COLECTOMY       Current Outpatient Medications   Medication Sig Dispense Refill    gabapentin (NEURONTIN) 100 MG capsule TAKE 2 CAPSULES BY MOUTH EVERY NIGHT AS NEEDED FOR NERVE PAIN. MAX DAILY AMOUNT: 200  capsule 1    glipiZIDE (GLUCOTROL) 5 MG tablet Take 1 tablet by mouth 2 times daily 180 tablet 3    lisinopril (PRINIVIL;ZESTRIL) 10 MG tablet Take 1 tablet by mouth daily 90 tablet 3    metFORMIN (GLUCOPHAGE) 1000 MG tablet Take 1 tablet by

## 2024-01-22 NOTE — PROGRESS NOTES
Chief Complaint   Patient presents with    Annual Exam         Health Maintenance Due   Topic Date Due    COVID-19 Vaccine (1) Never done    Pneumococcal 0-64 years Vaccine (1 - PCV) Never done    HIV screen  Never done    Diabetic retinal exam  Never done    Hepatitis C screen  Never done    DTaP/Tdap/Td vaccine (1 - Tdap) Never done    Shingles vaccine (1 of 2) Never done    Respiratory Syncytial Virus (RSV) Pregnant or age 60 yrs+ (1 - 1-dose 60+ series) Never done    Flu vaccine (1) 08/01/2023    Diabetic foot exam  11/28/2023    A1C test (Diabetic or Prediabetic)  11/28/2023    Diabetic Alb to Cr ratio (uACR) test  11/28/2023    Lipids  11/28/2023    Depression Screen  11/28/2023    GFR test (Diabetes, CKD 3-4, OR last GFR 15-59)  11/28/2023         \"Have you been to the ER, urgent care clinic since your last visit?  Hospitalized since your last visit?\"    NO    “Have you seen or consulted any other health care providers outside of [facility] since your last visit?”    NO

## 2024-01-22 NOTE — PATIENT INSTRUCTIONS
Restart metformin 1000 mg daily for 1 week to let you time to get used to it again and then resume 1000 mg twice daily.    Once on the full dose of metformin restart glipizide    Follow-up 3 months for diabetes follow-up and we will decide on any further medication changes

## 2024-01-24 LAB
25(OH)D3+25(OH)D2 SERPL-MCNC: 14.7 NG/ML (ref 30–100)
ALBUMIN SERPL-MCNC: 3.9 G/DL (ref 3.9–4.9)
ALBUMIN/GLOB SERPL: 1.3 {RATIO} (ref 1.2–2.2)
ALP SERPL-CCNC: 117 IU/L (ref 44–121)
ALT SERPL-CCNC: 18 IU/L (ref 0–44)
AST SERPL-CCNC: 18 IU/L (ref 0–40)
BASOPHILS # BLD AUTO: 0.1 X10E3/UL (ref 0–0.2)
BASOPHILS NFR BLD AUTO: 1 %
BILIRUB SERPL-MCNC: 0.2 MG/DL (ref 0–1.2)
BUN SERPL-MCNC: 12 MG/DL (ref 8–27)
BUN/CREAT SERPL: 15 (ref 10–24)
CALCIUM SERPL-MCNC: 9.1 MG/DL (ref 8.6–10.2)
CHLORIDE SERPL-SCNC: 98 MMOL/L (ref 96–106)
CHOLEST SERPL-MCNC: 214 MG/DL (ref 100–199)
CO2 SERPL-SCNC: 21 MMOL/L (ref 20–29)
CREAT SERPL-MCNC: 0.79 MG/DL (ref 0.76–1.27)
EGFRCR SERPLBLD CKD-EPI 2021: 101 ML/MIN/1.73
EOSINOPHIL # BLD AUTO: 0.1 X10E3/UL (ref 0–0.4)
EOSINOPHIL NFR BLD AUTO: 1 %
ERYTHROCYTE [DISTWIDTH] IN BLOOD BY AUTOMATED COUNT: 13.2 % (ref 11.6–15.4)
FOLATE SERPL-MCNC: 5 NG/ML
GLOBULIN SER CALC-MCNC: 3.1 G/DL (ref 1.5–4.5)
GLUCOSE SERPL-MCNC: 312 MG/DL (ref 70–99)
HBA1C MFR BLD: 12 % (ref 4.8–5.6)
HCT VFR BLD AUTO: 46.2 % (ref 37.5–51)
HDLC SERPL-MCNC: 36 MG/DL
HGB BLD-MCNC: 15.3 G/DL (ref 13–17.7)
IMM GRANULOCYTES # BLD AUTO: 0 X10E3/UL (ref 0–0.1)
IMM GRANULOCYTES NFR BLD AUTO: 0 %
IMP & REVIEW OF LAB RESULTS: NORMAL
LDLC SERPL CALC-MCNC: 91 MG/DL (ref 0–99)
LYMPHOCYTES # BLD AUTO: 2.9 X10E3/UL (ref 0.7–3.1)
LYMPHOCYTES NFR BLD AUTO: 28 %
Lab: NORMAL
MCH RBC QN AUTO: 29.3 PG (ref 26.6–33)
MCHC RBC AUTO-ENTMCNC: 33.1 G/DL (ref 31.5–35.7)
MCV RBC AUTO: 88 FL (ref 79–97)
MONOCYTES # BLD AUTO: 0.8 X10E3/UL (ref 0.1–0.9)
MONOCYTES NFR BLD AUTO: 8 %
NEUTROPHILS # BLD AUTO: 6.3 X10E3/UL (ref 1.4–7)
NEUTROPHILS NFR BLD AUTO: 62 %
PLATELET # BLD AUTO: 254 X10E3/UL (ref 150–450)
POTASSIUM SERPL-SCNC: 4.2 MMOL/L (ref 3.5–5.2)
PROT SERPL-MCNC: 7 G/DL (ref 6–8.5)
RBC # BLD AUTO: 5.23 X10E6/UL (ref 4.14–5.8)
SODIUM SERPL-SCNC: 136 MMOL/L (ref 134–144)
TRIGL SERPL-MCNC: 528 MG/DL (ref 0–149)
TSH SERPL DL<=0.005 MIU/L-ACNC: 1.3 UIU/ML (ref 0.45–4.5)
VIT B12 SERPL-MCNC: 442 PG/ML (ref 232–1245)
VLDLC SERPL CALC-MCNC: 87 MG/DL (ref 5–40)
WBC # BLD AUTO: 10.2 X10E3/UL (ref 3.4–10.8)

## 2024-11-08 ENCOUNTER — OFFICE VISIT (OUTPATIENT)
Age: 64
End: 2024-11-08
Payer: MEDICARE

## 2024-11-08 VITALS
BODY MASS INDEX: 31.36 KG/M2 | HEART RATE: 75 BPM | WEIGHT: 224 LBS | RESPIRATION RATE: 16 BRPM | HEIGHT: 71 IN | TEMPERATURE: 98.3 F | DIASTOLIC BLOOD PRESSURE: 78 MMHG | SYSTOLIC BLOOD PRESSURE: 127 MMHG | OXYGEN SATURATION: 98 %

## 2024-11-08 DIAGNOSIS — E11.42 TYPE 2 DIABETES MELLITUS WITH DIABETIC POLYNEUROPATHY, WITHOUT LONG-TERM CURRENT USE OF INSULIN (HCC): ICD-10-CM

## 2024-11-08 DIAGNOSIS — L72.3 INFLAMED SEBACEOUS CYST: Primary | ICD-10-CM

## 2024-11-08 LAB — HBA1C MFR BLD: 12.3 %

## 2024-11-08 PROCEDURE — 10060 I&D ABSCESS SIMPLE/SINGLE: CPT | Performed by: FAMILY MEDICINE

## 2024-11-08 PROCEDURE — 83036 HEMOGLOBIN GLYCOSYLATED A1C: CPT | Performed by: FAMILY MEDICINE

## 2024-11-08 PROCEDURE — 99213 OFFICE O/P EST LOW 20 MIN: CPT | Performed by: FAMILY MEDICINE

## 2024-11-08 ASSESSMENT — PATIENT HEALTH QUESTIONNAIRE - PHQ9
SUM OF ALL RESPONSES TO PHQ QUESTIONS 1-9: 0
SUM OF ALL RESPONSES TO PHQ9 QUESTIONS 1 & 2: 0
SUM OF ALL RESPONSES TO PHQ QUESTIONS 1-9: 0
1. LITTLE INTEREST OR PLEASURE IN DOING THINGS: NOT AT ALL
2. FEELING DOWN, DEPRESSED OR HOPELESS: NOT AT ALL

## 2024-11-08 NOTE — PROGRESS NOTES
Moe Hearn is a 64 y.o. male    Chief Complaint   Patient presents with    Cyst     Possible cyst on back       /78   Pulse 75   Temp 98.3 °F (36.8 °C)   Resp 16   Ht 1.803 m (5' 11\")   Wt 101.6 kg (224 lb)   SpO2 98%   BMI 31.24 kg/m²         1. Have you been to the ER, urgent care clinic since your last visit?  Hospitalized since your last visit? No    2. Have you seen or consulted any other health care providers outside of the [facility] since your last visit?  Include any pap smears or colon screening. No    Learning Assessment:       No data to display                Fall Risk Assessment:      11/8/2024     2:35 PM   Amb Fall Risk Assessment and TUG Test   Do you feel unsteady or are you worried about falling?  no   2 or more falls in past year? yes   Fall with injury in past year? no       Abuse Screening:       No data to display                ADL Screening:       No data to display

## 2024-11-08 NOTE — PROGRESS NOTES
HPI  Moe Hearn is a 64 y.o. male who presents with an inflamed sebaceous cyst.  The cyst has been bothering him on and off for the last year but he whacked it in the last day or 2 and it became inflamed and started draining.  See picture    PMHx:  Past Medical History:   Diagnosis Date    DM (diabetes mellitus) (HCC)     oral agent    Ex-smoker        Meds:   Current Outpatient Medications   Medication Sig Dispense Refill    gabapentin (NEURONTIN) 100 MG capsule TAKE 2 CAPSULES BY MOUTH EVERY NIGHT AS NEEDED FOR NERVE PAIN. MAX DAILY AMOUNT: 200  capsule 1    glipiZIDE (GLUCOTROL) 5 MG tablet Take 1 tablet by mouth 2 times daily 180 tablet 3    lisinopril (PRINIVIL;ZESTRIL) 10 MG tablet Take 1 tablet by mouth daily 90 tablet 3    metFORMIN (GLUCOPHAGE) 1000 MG tablet Take 1 tablet by mouth 2 times daily (with meals) 180 tablet 3    pravastatin (PRAVACHOL) 40 MG tablet Take 1 tablet by mouth nightly 90 tablet 3    Lancets MISC Test 3 times daily. (Patient not taking: Reported on 2024)       No current facility-administered medications for this visit.       Allergies:   Allergies   Allergen Reactions    Fenofibrate Rash     Rash located on the side and underneath the arm       Smoker:  Social History     Tobacco Use   Smoking Status Former    Current packs/day: 0.00    Average packs/day: 1 pack/day for 36.6 years (36.6 ttl pk-yrs)    Types: Cigarettes    Start date: 1982    Quit date: 2019    Years since quittin.7   Smokeless Tobacco Never       ETOH:   Social History     Substance and Sexual Activity   Alcohol Use No       FH:   Family History   Problem Relation Age of Onset    Pneumonia Mother     Heart Disease Father        ROS:   As listed in HPI. In addition:  Constitutional:   No headache, fever, fatigue, weight loss or weight gain      Cardiac:    No chest pain      Resp:   No cough or shortness of breath      Neuro   No loss of consciousness, dizziness, seizures      Physical